# Patient Record
Sex: FEMALE | Race: WHITE | Employment: OTHER | URBAN - METROPOLITAN AREA
[De-identification: names, ages, dates, MRNs, and addresses within clinical notes are randomized per-mention and may not be internally consistent; named-entity substitution may affect disease eponyms.]

---

## 2018-03-30 ENCOUNTER — APPOINTMENT (OUTPATIENT)
Dept: GENERAL RADIOLOGY | Age: 83
End: 2018-03-30
Attending: EMERGENCY MEDICINE
Payer: MEDICARE

## 2018-03-30 ENCOUNTER — APPOINTMENT (OUTPATIENT)
Dept: CT IMAGING | Age: 83
End: 2018-03-30
Attending: EMERGENCY MEDICINE
Payer: MEDICARE

## 2018-03-30 ENCOUNTER — HOSPITAL ENCOUNTER (OUTPATIENT)
Age: 83
Setting detail: OBSERVATION
Discharge: HOME HEALTH | End: 2018-04-01
Attending: EMERGENCY MEDICINE | Admitting: INTERNAL MEDICINE
Payer: MEDICARE

## 2018-03-30 DIAGNOSIS — R50.9 FEVER, UNSPECIFIED FEVER CAUSE: ICD-10-CM

## 2018-03-30 DIAGNOSIS — M19.90 ACUTE ARTHRITIS: ICD-10-CM

## 2018-03-30 DIAGNOSIS — M25.462 KNEE EFFUSION, LEFT: ICD-10-CM

## 2018-03-30 DIAGNOSIS — M25.562 ACUTE PAIN OF LEFT KNEE: Primary | ICD-10-CM

## 2018-03-30 DIAGNOSIS — E86.0 DEHYDRATION: ICD-10-CM

## 2018-03-30 PROBLEM — D72.829 LEUKOCYTOSIS: Status: ACTIVE | Noted: 2018-03-30

## 2018-03-30 PROBLEM — I10 HTN (HYPERTENSION): Status: ACTIVE | Noted: 2018-03-30

## 2018-03-30 PROBLEM — E78.00 HIGH CHOLESTEROL: Chronic | Status: ACTIVE | Noted: 2018-03-30

## 2018-03-30 PROBLEM — M10.9 GOUT: Status: ACTIVE | Noted: 2018-03-30

## 2018-03-30 PROBLEM — R79.89 ELEVATED SERUM CREATININE: Status: ACTIVE | Noted: 2018-03-30

## 2018-03-30 PROBLEM — R73.9 HYPERGLYCEMIA: Status: ACTIVE | Noted: 2018-03-30

## 2018-03-30 PROBLEM — I10 HTN (HYPERTENSION): Chronic | Status: ACTIVE | Noted: 2018-03-30

## 2018-03-30 LAB
ALBUMIN SERPL-MCNC: 3 G/DL (ref 3.5–5)
ALBUMIN/GLOB SERPL: 0.8 {RATIO} (ref 1.1–2.2)
ALP SERPL-CCNC: 72 U/L (ref 45–117)
ALT SERPL-CCNC: 17 U/L (ref 12–78)
ANION GAP SERPL CALC-SCNC: 11 MMOL/L (ref 5–15)
APPEARANCE FLD: ABNORMAL
APPEARANCE UR: CLEAR
AST SERPL-CCNC: 20 U/L (ref 15–37)
BACTERIA URNS QL MICRO: NEGATIVE /HPF
BASOPHILS # BLD: 0.1 K/UL (ref 0–0.1)
BASOPHILS NFR BLD: 1 % (ref 0–1)
BILIRUB SERPL-MCNC: 0.9 MG/DL (ref 0.2–1)
BILIRUB UR QL: NEGATIVE
BODY FLD TYPE: NORMAL
BUN SERPL-MCNC: 40 MG/DL (ref 6–20)
BUN/CREAT SERPL: 33 (ref 12–20)
CALCIUM SERPL-MCNC: 10.8 MG/DL (ref 8.5–10.1)
CHLORIDE SERPL-SCNC: 100 MMOL/L (ref 97–108)
CO2 SERPL-SCNC: 27 MMOL/L (ref 21–32)
COLOR FLD: ABNORMAL
COLOR UR: ABNORMAL
CREAT SERPL-MCNC: 1.23 MG/DL (ref 0.55–1.02)
CRP SERPL-MCNC: 5.42 MG/DL
CRYSTALS FLD MICRO: NORMAL
DIFFERENTIAL METHOD BLD: ABNORMAL
EOSINOPHIL # BLD: 0 K/UL (ref 0–0.4)
EOSINOPHIL NFR BLD: 0 % (ref 0–7)
EPITH CASTS URNS QL MICRO: ABNORMAL /LPF
ERYTHROCYTE [DISTWIDTH] IN BLOOD BY AUTOMATED COUNT: 13 % (ref 11.5–14.5)
ERYTHROCYTE [SEDIMENTATION RATE] IN BLOOD: 66 MM/HR (ref 0–30)
GLOBULIN SER CALC-MCNC: 3.8 G/DL (ref 2–4)
GLUCOSE SERPL-MCNC: 133 MG/DL (ref 65–100)
GLUCOSE UR STRIP.AUTO-MCNC: NEGATIVE MG/DL
HCT VFR BLD AUTO: 32.5 % (ref 35–47)
HGB BLD-MCNC: 10.6 G/DL (ref 11.5–16)
HGB UR QL STRIP: NEGATIVE
IMM GRANULOCYTES # BLD: 0 K/UL (ref 0–0.04)
IMM GRANULOCYTES NFR BLD AUTO: 0 % (ref 0–0.5)
KETONES UR QL STRIP.AUTO: ABNORMAL MG/DL
LACTATE SERPL-SCNC: 1 MMOL/L (ref 0.4–2)
LEUKOCYTE ESTERASE UR QL STRIP.AUTO: NEGATIVE
LYMPHOCYTES # BLD: 0.1 K/UL (ref 0.8–3.5)
LYMPHOCYTES NFR BLD: 1 % (ref 12–49)
LYMPHOCYTES NFR FLD: 2 %
MCH RBC QN AUTO: 30.5 PG (ref 26–34)
MCHC RBC AUTO-ENTMCNC: 32.6 G/DL (ref 30–36.5)
MCV RBC AUTO: 93.7 FL (ref 80–99)
MONOCYTES # BLD: 0.2 K/UL (ref 0–1)
MONOCYTES NFR BLD: 2 % (ref 5–13)
MONOS+MACROS NFR FLD: 3 %
NEUTROPHILS NFR FLD: 95 %
NEUTS BAND NFR BLD MANUAL: 6 %
NEUTS SEG # BLD: 10.2 K/UL (ref 1.8–8)
NEUTS SEG NFR BLD: 90 % (ref 32–75)
NITRITE UR QL STRIP.AUTO: NEGATIVE
NRBC # BLD: 0 K/UL (ref 0–0.01)
NRBC BLD-RTO: 0 PER 100 WBC
NUC CELL # FLD: ABNORMAL /CU MM
PH UR STRIP: 5 [PH] (ref 5–8)
PLATELET # BLD AUTO: 188 K/UL (ref 150–400)
PMV BLD AUTO: 10 FL (ref 8.9–12.9)
POTASSIUM SERPL-SCNC: 3.6 MMOL/L (ref 3.5–5.1)
PROT SERPL-MCNC: 6.8 G/DL (ref 6.4–8.2)
PROT UR STRIP-MCNC: NEGATIVE MG/DL
RBC # BLD AUTO: 3.47 M/UL (ref 3.8–5.2)
RBC # FLD: >100 /CU MM
RBC #/AREA URNS HPF: ABNORMAL /HPF (ref 0–5)
RBC MORPH BLD: ABNORMAL
SODIUM SERPL-SCNC: 138 MMOL/L (ref 136–145)
SP GR UR REFRACTOMETRY: 1.01 (ref 1–1.03)
SPECIMEN SOURCE FLD: ABNORMAL
TROPONIN I SERPL-MCNC: <0.04 NG/ML
UA: UC IF INDICATED,UAUC: ABNORMAL
UROBILINOGEN UR QL STRIP.AUTO: 0.2 EU/DL (ref 0.2–1)
WBC # BLD AUTO: 10.6 K/UL (ref 3.6–11)
WBC URNS QL MICRO: ABNORMAL /HPF (ref 0–4)

## 2018-03-30 PROCEDURE — 89060 EXAM SYNOVIAL FLUID CRYSTALS: CPT | Performed by: EMERGENCY MEDICINE

## 2018-03-30 PROCEDURE — 77030032490 HC SLV COMPR SCD KNE COVD -B

## 2018-03-30 PROCEDURE — 73562 X-RAY EXAM OF KNEE 3: CPT

## 2018-03-30 PROCEDURE — 70450 CT HEAD/BRAIN W/O DYE: CPT

## 2018-03-30 PROCEDURE — 80053 COMPREHEN METABOLIC PANEL: CPT | Performed by: EMERGENCY MEDICINE

## 2018-03-30 PROCEDURE — 81001 URINALYSIS AUTO W/SCOPE: CPT | Performed by: EMERGENCY MEDICINE

## 2018-03-30 PROCEDURE — 87205 SMEAR GRAM STAIN: CPT | Performed by: EMERGENCY MEDICINE

## 2018-03-30 PROCEDURE — 74011250636 HC RX REV CODE- 250/636: Performed by: INTERNAL MEDICINE

## 2018-03-30 PROCEDURE — 72125 CT NECK SPINE W/O DYE: CPT

## 2018-03-30 PROCEDURE — 51798 US URINE CAPACITY MEASURE: CPT

## 2018-03-30 PROCEDURE — 96361 HYDRATE IV INFUSION ADD-ON: CPT

## 2018-03-30 PROCEDURE — 99218 HC RM OBSERVATION: CPT

## 2018-03-30 PROCEDURE — 75810000054 HC ARTHOCENTISIS JOINT

## 2018-03-30 PROCEDURE — 99285 EMERGENCY DEPT VISIT HI MDM: CPT

## 2018-03-30 PROCEDURE — 74011250636 HC RX REV CODE- 250/636: Performed by: EMERGENCY MEDICINE

## 2018-03-30 PROCEDURE — 85025 COMPLETE CBC W/AUTO DIFF WBC: CPT | Performed by: EMERGENCY MEDICINE

## 2018-03-30 PROCEDURE — 96360 HYDRATION IV INFUSION INIT: CPT

## 2018-03-30 PROCEDURE — 74011250637 HC RX REV CODE- 250/637: Performed by: EMERGENCY MEDICINE

## 2018-03-30 PROCEDURE — 73030 X-RAY EXAM OF SHOULDER: CPT

## 2018-03-30 PROCEDURE — 93005 ELECTROCARDIOGRAM TRACING: CPT

## 2018-03-30 PROCEDURE — 84484 ASSAY OF TROPONIN QUANT: CPT | Performed by: EMERGENCY MEDICINE

## 2018-03-30 PROCEDURE — 77030011943

## 2018-03-30 PROCEDURE — 74011250636 HC RX REV CODE- 250/636: Performed by: PHYSICIAN ASSISTANT

## 2018-03-30 PROCEDURE — 85652 RBC SED RATE AUTOMATED: CPT | Performed by: EMERGENCY MEDICINE

## 2018-03-30 PROCEDURE — 71045 X-RAY EXAM CHEST 1 VIEW: CPT

## 2018-03-30 PROCEDURE — 74011000250 HC RX REV CODE- 250: Performed by: PHYSICIAN ASSISTANT

## 2018-03-30 PROCEDURE — 83605 ASSAY OF LACTIC ACID: CPT | Performed by: EMERGENCY MEDICINE

## 2018-03-30 PROCEDURE — 86140 C-REACTIVE PROTEIN: CPT | Performed by: EMERGENCY MEDICINE

## 2018-03-30 PROCEDURE — 96372 THER/PROPH/DIAG INJ SC/IM: CPT

## 2018-03-30 PROCEDURE — 74011000250 HC RX REV CODE- 250: Performed by: EMERGENCY MEDICINE

## 2018-03-30 PROCEDURE — 73110 X-RAY EXAM OF WRIST: CPT

## 2018-03-30 PROCEDURE — 74011250637 HC RX REV CODE- 250/637: Performed by: INTERNAL MEDICINE

## 2018-03-30 PROCEDURE — 89050 BODY FLUID CELL COUNT: CPT | Performed by: EMERGENCY MEDICINE

## 2018-03-30 PROCEDURE — 36415 COLL VENOUS BLD VENIPUNCTURE: CPT | Performed by: EMERGENCY MEDICINE

## 2018-03-30 PROCEDURE — 51701 INSERT BLADDER CATHETER: CPT

## 2018-03-30 RX ORDER — CHOLECALCIFEROL (VITAMIN D3) 125 MCG
1 CAPSULE ORAL DAILY
COMMUNITY

## 2018-03-30 RX ORDER — SIMVASTATIN 20 MG/1
40 TABLET, FILM COATED ORAL
Status: DISCONTINUED | OUTPATIENT
Start: 2018-03-30 | End: 2018-04-01 | Stop reason: HOSPADM

## 2018-03-30 RX ORDER — LIDOCAINE HYDROCHLORIDE 10 MG/ML
2 INJECTION, SOLUTION EPIDURAL; INFILTRATION; INTRACAUDAL; PERINEURAL
Status: COMPLETED | OUTPATIENT
Start: 2018-03-30 | End: 2018-03-30

## 2018-03-30 RX ORDER — ANASTROZOLE 1 MG/1
1 TABLET ORAL DAILY
Status: DISCONTINUED | OUTPATIENT
Start: 2018-03-31 | End: 2018-04-01 | Stop reason: HOSPADM

## 2018-03-30 RX ORDER — OXYCODONE AND ACETAMINOPHEN 5; 325 MG/1; MG/1
1 TABLET ORAL
Status: DISCONTINUED | OUTPATIENT
Start: 2018-03-30 | End: 2018-04-01 | Stop reason: HOSPADM

## 2018-03-30 RX ORDER — HYDROMORPHONE HYDROCHLORIDE 2 MG/ML
0.5 INJECTION, SOLUTION INTRAMUSCULAR; INTRAVENOUS; SUBCUTANEOUS
Status: DISCONTINUED | OUTPATIENT
Start: 2018-03-30 | End: 2018-04-01 | Stop reason: HOSPADM

## 2018-03-30 RX ORDER — HEPARIN SODIUM 5000 [USP'U]/ML
5000 INJECTION, SOLUTION INTRAVENOUS; SUBCUTANEOUS EVERY 8 HOURS
Status: DISCONTINUED | OUTPATIENT
Start: 2018-03-30 | End: 2018-04-01 | Stop reason: HOSPADM

## 2018-03-30 RX ORDER — TRIAMCINOLONE ACETONIDE 40 MG/ML
40 INJECTION, SUSPENSION INTRA-ARTICULAR; INTRAMUSCULAR
Status: COMPLETED | OUTPATIENT
Start: 2018-03-30 | End: 2018-03-30

## 2018-03-30 RX ORDER — ACETAMINOPHEN 325 MG/1
650 TABLET ORAL
Status: DISCONTINUED | OUTPATIENT
Start: 2018-03-30 | End: 2018-04-01 | Stop reason: HOSPADM

## 2018-03-30 RX ORDER — BUPIVACAINE HYDROCHLORIDE 5 MG/ML
3 INJECTION, SOLUTION EPIDURAL; INTRACAUDAL
Status: COMPLETED | OUTPATIENT
Start: 2018-03-30 | End: 2018-03-30

## 2018-03-30 RX ORDER — SODIUM CHLORIDE 0.9 % (FLUSH) 0.9 %
5-10 SYRINGE (ML) INJECTION AS NEEDED
Status: DISCONTINUED | OUTPATIENT
Start: 2018-03-30 | End: 2018-04-01 | Stop reason: HOSPADM

## 2018-03-30 RX ORDER — SODIUM CHLORIDE 9 MG/ML
75 INJECTION, SOLUTION INTRAVENOUS CONTINUOUS
Status: DISCONTINUED | OUTPATIENT
Start: 2018-03-30 | End: 2018-04-01 | Stop reason: HOSPADM

## 2018-03-30 RX ORDER — DOCUSATE SODIUM 100 MG/1
100 CAPSULE, LIQUID FILLED ORAL 2 TIMES DAILY
Status: DISCONTINUED | OUTPATIENT
Start: 2018-03-30 | End: 2018-04-01 | Stop reason: HOSPADM

## 2018-03-30 RX ORDER — PROCHLORPERAZINE EDISYLATE 5 MG/ML
10 INJECTION INTRAMUSCULAR; INTRAVENOUS
Status: DISCONTINUED | OUTPATIENT
Start: 2018-03-30 | End: 2018-04-01 | Stop reason: HOSPADM

## 2018-03-30 RX ORDER — LIDOCAINE HYDROCHLORIDE AND EPINEPHRINE 10; 10 MG/ML; UG/ML
1.5 INJECTION, SOLUTION INFILTRATION; PERINEURAL ONCE
Status: COMPLETED | OUTPATIENT
Start: 2018-03-30 | End: 2018-03-30

## 2018-03-30 RX ORDER — SODIUM CHLORIDE 0.9 % (FLUSH) 0.9 %
5-10 SYRINGE (ML) INJECTION EVERY 8 HOURS
Status: DISCONTINUED | OUTPATIENT
Start: 2018-03-30 | End: 2018-04-01 | Stop reason: HOSPADM

## 2018-03-30 RX ORDER — ANASTROZOLE 1 MG/1
1 TABLET ORAL DAILY
COMMUNITY
End: 2020-09-21

## 2018-03-30 RX ORDER — POLYETHYLENE GLYCOL 3350 17 G/17G
17 POWDER, FOR SOLUTION ORAL
Status: DISPENSED | OUTPATIENT
Start: 2018-03-30 | End: 2018-03-30

## 2018-03-30 RX ORDER — TRAMADOL HYDROCHLORIDE 50 MG/1
50 TABLET ORAL
Status: COMPLETED | OUTPATIENT
Start: 2018-03-30 | End: 2018-03-30

## 2018-03-30 RX ORDER — LOSARTAN POTASSIUM AND HYDROCHLOROTHIAZIDE 25; 100 MG/1; MG/1
1 TABLET ORAL DAILY
COMMUNITY
End: 2020-09-21

## 2018-03-30 RX ADMIN — POLYETHYLENE GLYCOL (3350) 17 G: 17 POWDER, FOR SOLUTION ORAL at 18:02

## 2018-03-30 RX ADMIN — OXYCODONE HYDROCHLORIDE AND ACETAMINOPHEN 1 TABLET: 5; 325 TABLET ORAL at 15:15

## 2018-03-30 RX ADMIN — POLYETHYLENE GLYCOL (3350) 17 G: 17 POWDER, FOR SOLUTION ORAL at 17:17

## 2018-03-30 RX ADMIN — DOCUSATE SODIUM 100 MG: 100 CAPSULE, LIQUID FILLED ORAL at 17:17

## 2018-03-30 RX ADMIN — LIDOCAINE HYDROCHLORIDE,EPINEPHRINE BITARTRATE 15 MG: 10; .01 INJECTION, SOLUTION INFILTRATION; PERINEURAL at 11:15

## 2018-03-30 RX ADMIN — OXYCODONE HYDROCHLORIDE AND ACETAMINOPHEN 1 TABLET: 5; 325 TABLET ORAL at 20:59

## 2018-03-30 RX ADMIN — HEPARIN SODIUM 5000 UNITS: 5000 INJECTION, SOLUTION INTRAVENOUS; SUBCUTANEOUS at 20:58

## 2018-03-30 RX ADMIN — SIMVASTATIN 40 MG: 20 TABLET, FILM COATED ORAL at 20:58

## 2018-03-30 RX ADMIN — LIDOCAINE HYDROCHLORIDE 2 ML: 10 INJECTION, SOLUTION EPIDURAL; INFILTRATION; INTRACAUDAL; PERINEURAL at 17:47

## 2018-03-30 RX ADMIN — BUPIVACAINE HYDROCHLORIDE 15 MG: 5 INJECTION, SOLUTION EPIDURAL; INTRACAUDAL at 17:47

## 2018-03-30 RX ADMIN — TRAMADOL HYDROCHLORIDE 50 MG: 50 TABLET, FILM COATED ORAL at 10:33

## 2018-03-30 RX ADMIN — SODIUM CHLORIDE 75 ML/HR: 900 INJECTION, SOLUTION INTRAVENOUS at 13:06

## 2018-03-30 RX ADMIN — HEPARIN SODIUM 5000 UNITS: 5000 INJECTION, SOLUTION INTRAVENOUS; SUBCUTANEOUS at 13:35

## 2018-03-30 RX ADMIN — SODIUM CHLORIDE 1000 ML: 900 INJECTION, SOLUTION INTRAVENOUS at 12:13

## 2018-03-30 RX ADMIN — TRIAMCINOLONE ACETONIDE 40 MG: 40 INJECTION, SUSPENSION INTRA-ARTICULAR; INTRAMUSCULAR at 17:48

## 2018-03-30 RX ADMIN — Medication 10 ML: at 13:08

## 2018-03-30 RX ADMIN — POLYETHYLENE GLYCOL (3350) 17 G: 17 POWDER, FOR SOLUTION ORAL at 20:59

## 2018-03-30 RX ADMIN — Medication 5 ML: at 21:00

## 2018-03-30 RX ADMIN — POLYETHYLENE GLYCOL (3350) 17 G: 17 POWDER, FOR SOLUTION ORAL at 19:36

## 2018-03-30 NOTE — ED PROVIDER NOTES
HPI Comments: 80 y.o. female with past medical history significant for ovarian CA, HTN, and high cholsterol who presents from home with chief complaint of weakness. The pt reports that she had difficulty getting out of bed this morning due to LLE weakness and pain in the L knee. The pt also reports neck pain and LUE pain. The pt reports that she has chronic decreased  strength in the L hand. 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 The pt had the port for 1.5 years. The pt normally ambulates with a a rollator. The pt is on Tramadol for the pain. The pt was taken off Xarelto a few weeks prior to the surgery and was told that she did not need to start it again. The pt denies fever, vomiting, CP, SOB, abdominal pain. There are no other acute medical concerns at this time. Social hx: nonsmoker   PCP: Nereida Walton MD    Note written by Aakash Anderson, as dictated by Jen Escobar MD 8:35 AM      The history is provided by the patient. No  was used. Past Medical History:   Diagnosis Date    High cholesterol     Hypertension     Ovarian cancer [facility])        Past Surgical History:   Procedure Laterality Date    HX HYSTERECTOMY      HX KNEE REPLACEMENT      right 2010    HX SHOULDER REPLACEMENT      right 2011         No family history on file. Social History     Social History    Marital status:      Spouse name: N/A    Number of children: N/A    Years of education: N/A     Occupational History    Not on file. Social History Main Topics    Smoking status: Never Smoker    Smokeless tobacco: Not on file    Alcohol use Not on file    Drug use: Not on file    Sexual activity: Not on file     Other Topics Concern    Not on file     Social History Narrative    No narrative on file         ALLERGIES: Aleve [naproxen sodium]    Review of Systems   Constitutional: Negative for fever.    Eyes: Negative for visual disturbance. Respiratory: Negative for cough, shortness of breath and wheezing. Cardiovascular: Negative for chest pain and leg swelling. Gastrointestinal: Negative for abdominal pain, diarrhea, nausea and vomiting. Genitourinary: Negative for dysuria. Musculoskeletal: Positive for neck pain. Negative for back pain and neck stiffness. L knee pain;  LUE pain;     Skin: Negative for rash. Neurological: Positive for weakness. Negative for syncope and headaches. Psychiatric/Behavioral: Negative for confusion. All other systems reviewed and are negative. There were no vitals filed for this visit. Physical Exam   Constitutional: She appears well-developed and well-nourished. No distress. HENT:   Head: Normocephalic. Eyes: Pupils are equal, round, and reactive to light. Neck: Normal range of motion. Neck supple. Cardiovascular: Normal rate and regular rhythm. No murmur heard. Distal pulses intact;     Pulmonary/Chest: Effort normal and breath sounds normal. No respiratory distress. Abdominal: Soft. There is no tenderness. Musculoskeletal: Normal range of motion. She exhibits no edema. Pain with neck flexion;  L knee has swelling and is warm; possible effusion;     Neurological: She is alert. She has normal strength. No cranial nerve deficit. Weak  strength in the L hand;  LLE weakness possibly due to knee pain;     Skin: Skin is warm and dry. Surgical incisions healing well;     Psychiatric: She has a normal mood and affect. Her behavior is normal.   Nursing note and vitals reviewed.      Note written by Glentana OfficerAakash, as dictated by Melania Denise MD 8:38 AM    [facility]      ED Course       Arthrocentesis  Date/Time: 3/30/2018 11:14 AM  Performed by: Dimitrios Lind  Authorized by: Dimitrios Lind     Consent:     Consent obtained:  Verbal    Consent given by:  Patient    Risks discussed:  Bleeding, incomplete drainage, nerve damage, infection, pain and poor cosmetic result  Location:     Location:  Knee    Knee:  L knee  Anesthesia (see MAR for exact dosages): Anesthesia method:  Local infiltration    Local anesthetic:  Lidocaine 1% WITH epi  Procedure details:     Preparation: Patient was prepped and draped in usual sterile fashion      Needle gauge:  18 G    Ultrasound guidance: no      Approach: inferolateral.    Aspirate amount:  15 cc    Aspirate characteristics:  Cloudy and yellow    Steroid injected: no      Specimen collected: yes    Post-procedure details:     Dressing: 4x4. Patient tolerance of procedure: Tolerated well, no immediate complications  Comments:      Skin prepped with Betadine   Note written by Aakash Murphy, as dictated by Kentrell Cunningham MD 11:16 AM          ED EKG interpretation:  Rhythm: normal sinus rhythm; and regular . Rate (approx.): 83; Axis: normal; ST/T wave: non-specific changes; Note written by Aakash Murphy, as dictated by Kentrell Cunningham MD 8:21 AM    CONSULT NOTE:  11:20 AM Kentrell Cunningham MD spoke with Dr. Lakisha Sanchez , Consult for Hospitalist.  Discussed available diagnostic tests and clinical findings. She will see and admit the pt.

## 2018-03-30 NOTE — PROGRESS NOTES
1640: Patient has not voided since straight cathed in the ED. Bladder scan shows 459mL. Notified Dr. Agusto Bañuelos. Orders to straight cath PRN for bladder scans greater than 200mL. Patient with no BM since 3/25. Notified Dr. Agusto Bañuelos. Orders for miralax q1hr for 4 doses. 1830: Patient attempted to use bed pan multiple times. Unsuccessful. Patient straight cathed. Drained 625mL. Will continue to monitor. Bedside shift change report given to [address] (oncoming nurse) by Anaya Barry RN (offgoing nurse). Report included the following information SBAR, Kardex, Intake/Output, MAR and Recent Results.

## 2018-03-30 NOTE — IP AVS SNAPSHOT
[facility] 
 
 
 [address] [phone] Patient: Rosa Canela MRN: [id number] HECTOR:0/1/1982 About your hospitalization You were admitted on:  March 30, 2018 You last received care in the:  [facility] 5M1 MED SURG 1 You were discharged on:  April 1, 2018 Why you were hospitalized Your primary diagnosis was:  Acute Arthritis Your diagnoses also included:  Htn (Hypertension), High Cholesterol, Elevated Serum Creatinine, Hyperglycemia, Urinary Retention Follow-up Information Follow up With Details Comments Contact Info Gurvinder Cardona MD In 2 weeks  [address] 
[phone] Reno Estes MD   [address] [address] 
[phone] Discharge Orders None A check emy indicates which time of day the medication should be taken. My Medications START taking these medications Instructions Each Dose to Equal  
 Morning Noon Evening Bedtime  
 oxyCODONE-acetaminophen 5-325 mg per tablet Commonly known as:  PERCOCET Your last dose was: Your next dose is: Take 1 Tab by mouth every four (4) hours as needed. Max Daily Amount: 6 Tabs. 1 Tab  
    
   
   
   
  
 senna-docusate 8.6-50 mg per tablet Commonly known as:  Cleavon Hope Your last dose was: Your next dose is: Take 1 Tab by mouth two (2) times a day. To prevent constipation while taking pain medicines. Do not take if you have diarrhea. 1 Tab  
    
   
   
   
  
 tamsulosin 0.4 mg capsule Commonly known as:  FLOMAX Start taking on:  4/2/2018 Your last dose was: Your next dose is: Take 1 Cap by mouth daily for 7 days. 0.4 mg  
    
   
   
   
  
  
CONTINUE taking these medications Instructions Each Dose to Equal  
 Morning Noon Evening Bedtime  
 anastrozole 1 mg tablet Commonly known as:  ARIMIDEX Your last dose was: Your next dose is: Take 1 mg by mouth daily. 1 mg LECITHIN PO Your last dose was: Your next dose is: Take 1,200 mg by mouth daily. 1200 mg  
    
   
   
   
  
 losartan-hydroCHLOROthiazide 100-25 mg per tablet Commonly known as:  HYZAAR Your last dose was: Your next dose is: Take 1 Tab by mouth daily. 1 Tab TRAMADOL PO Your last dose was: Your next dose is: Take 50 mg by mouth two (2) times a day. 50 mg  
    
   
   
   
  
 VITAMIN D3 2,000 unit Tab Generic drug:  cholecalciferol (vitamin D3) Your last dose was: Your next dose is: Take 1 Tab by mouth daily. 1 Tab ZOCOR 40 mg tablet Generic drug:  simvastatin Your last dose was: Your next dose is: Take 40 mg by mouth nightly. 40 mg  
    
   
   
   
  
  
STOP taking these medications STOOL SOFTENER PO Where to Get Your Medications Information on where to get these meds will be given to you by the nurse or doctor. ! Ask your nurse or doctor about these medications  
  oxyCODONE-acetaminophen 5-325 mg per tablet  
 senna-docusate 8.6-50 mg per tablet  
 tamsulosin 0.4 mg capsule Opioid Education Prescription Opioids: What You Need to Know: 
 
 
ADMIT DATE: 3/30/2018 DISCHARGE DATE: 2018 DISCHARGE DIAGNOSIS: 
Acute arthritis MEDICATIONS: 
· It is important that you take the medication exactly as they are prescribed. · Keep your medication in the bottles provided by the pharmacist and keep a list of the medication names, dosages, and times to be taken in your wallet. · Do not take other medications without consulting your doctor. Pain Management: per above medications What to do at [facility] Recommended diet:  Regular Diet Recommended activity: Activity as tolerated If you experience any of the following symptoms then please call your primary care physician or return to the emergency room if you cannot get hold of your doctor: 
Fever, chills, nausea, vomiting, diarrhea, change in mentation, falling, bleeding, shortness of breath Follow Up: Follow-up Information Follow up With Details Comments Contact Info Yissel Blevins MD In 2 weeks  [address] 
[phone] Rin Dasilva MD   [address] [address] 
[phone] Information obtained by : 
I understand that if any problems occur once I am at home I am to contact my physician. I understand and acknowledge receipt of the instructions indicated above. Physician's or R.N.'s Signature                                                                  Date/Time Patient or Representative Signature                                                          Date/Time Introducing [facility]! Tdo Brandon introduces CanaryHop patient portal. Now you can access parts of your medical record, email your doctor's office, and request medication refills online. 1. In your internet browser, go to [email or web address] 2. Click on the First Time User? Click Here link in the Sign In box. You will see the New Member Sign Up page. 3. Enter your Bidstalk Access Code exactly as it appears below. You will not need to use this code after youve completed the sign-up process. If you do not sign up before the expiration date, you must request a new code. · Bidstalk Access Code: [email or web address] Expires: 6/30/2018  9:05 AM 
 
4. Enter the last four digits of your Social Security Number (xxxx) and Date of Birth (mm/dd/yyyy) as indicated and click Submit. You will be taken to the next sign-up page. 5. Create a Bidstalk ID. This will be your Bidstalk login ID and cannot be changed, so think of one that is secure and easy to remember. 6. Create a Bidstalk password. You can change your password at any time. 7. Enter your Password Reset Question and Answer. This can be used at a later time if you forget your password. 8. Enter your e-mail address. You will receive e-mail notification when new information is available in [address] 9. Click Sign Up. You can now view and download portions of your medical record. 10. Click the Download Summary menu link to download a portable copy of your medical information. If you have questions, please visit the Frequently Asked Questions section of the Bidstalk website. Remember, Bidstalk is NOT to be used for urgent needs. For medical emergencies, dial 911. Now available from your iPhone and Android! Introducing Michael Villanueva As a [facility] patient, I wanted to make you aware of our electronic visit tool called Michael Villanueva. [facility] 24/7 allows you to connect within minutes with a medical provider 24 hours a day, seven days a week via a mobile device or tablet or logging into a secure website from your computer. You can access Michael Villanueva from anywhere in the [place].  
 
A virtual visit might be right for you when you have a simple condition and feel like you just dont want to get out of bed, or cant get away from work for an appointment, when your regular [facility] provider is not available (evenings, weekends or holidays), or when youre out of town and need minor care. Electronic visits cost only $49 and if the [facility] 24/7 provider determines a prescription is needed to treat your condition, one can be electronically transmitted to a nearby pharmacy*. Please take a moment to enroll today if you have not already done so. The enrollment process is free and takes just a few minutes. To enroll, please download the [facility] 24/7 lyssa to your tablet or phone, or visit [email or web address] to enroll on your computer. And, as an [address] [place] [address] patient with a Notegraphy account, the results of your visits will be scanned into your electronic medical record and your primary care provider will be able to view the scanned results. We urge you to continue to see your regular [facility] provider for your ongoing medical care. And while your primary care provider may not be the one available when you seek a Quemuluslindsayfin virtual visit, the peace of mind you get from getting a real diagnosis real time can be priceless. For more information on Gopeers, view our Frequently Asked Questions (FAQs) at [email or web address]. Sincerely, 
 
Kang Loaiza MD 
Chief Medical Officer [address] *:  certain medications cannot be prescribed via Gopeers Unresulted Labs-Please follow up with your PCP about these lab tests Order Current Status CULTURE, BODY FLUID W GRAM STAIN Preliminary result Providers Seen During Your Hospitalization Provider Specialty Primary office phone Juana Maldonado MD Emergency Medicine [phone] Raf Glasgow MD Internal Medicine [phone] Your Primary Care Physician (PCP) Primary Care Physician Office Phone Office Fax Edel [place] [phone] You are allergic to the following Allergen Reactions Aleve (Naproxen Sodium) Hives Codeine Nausea and Vomiting Recent Documentation Height Weight Breastfeeding? BMI OB Status Smoking Status 1.549 m 60.3 kg No 25.13 kg/m2 Hysterectomy Never Smoker Emergency Contacts Name Discharge Info Relation Home Work Mobile Jose Luis Levy DISCHARGE CAREGIVER [3] Son [22] [phone] [address] CAREGIVER [3] Daughter [21]   [phone] Patient Belongings The following personal items are in your possession at time of discharge: 
  Dental Appliances: None  Visual Aid: Glasses, With patient   Hearing Aids/Status: At home  Home Medications: None   Jewelry: None  Clothing: Pajamas    Other Valuables: Cell Phone, Eyeglasses  Personal Items Sent to Safe: none Please provide this summary of care documentation to your next provider. Signatures-by signing, you are acknowledging that this After Visit Summary has been reviewed with you and you have received a copy. Patient Signature:  ____________________________________________________________ Date:  ____________________________________________________________  
  
Stephany Salguero Provider Signature:  ____________________________________________________________ Date:  ____________________________________________________________

## 2018-03-30 NOTE — IP AVS SNAPSHOT
[facility] 
 
 
 [address] [facility] [address] [facility] 
[phone] Patient: Lore Hylton MRN: [id number] XZI:[birth year] A check emy indicates which time of day the medication should be taken. My Medications START taking these medications Instructions Each Dose to Equal  
 Morning Noon Evening Bedtime  
 oxyCODONE-acetaminophen 5-325 mg per tablet Commonly known as:  PERCOCET Your last dose was: Your next dose is: Take 1 Tab by mouth every four (4) hours as needed. Max Daily Amount: 6 Tabs. 1 Tab  
    
   
   
   
  
 senna-docusate 8.6-50 mg per tablet Commonly known as:  Mansi Fresh Your last dose was: Your next dose is: Take 1 Tab by mouth two (2) times a day. To prevent constipation while taking pain medicines. Do not take if you have diarrhea. 1 Tab  
    
   
   
   
  
 tamsulosin 0.4 mg capsule Commonly known as:  FLOMAX Start taking on:  4/2/2018 Your last dose was: Your next dose is: Take 1 Cap by mouth daily for 7 days. 0.4 mg  
    
   
   
   
  
  
CONTINUE taking these medications Instructions Each Dose to Equal  
 Morning Noon Evening Bedtime  
 anastrozole 1 mg tablet Commonly known as:  ARIMIDEX Your last dose was: Your next dose is: Take 1 mg by mouth daily. 1 mg LECITHIN PO Your last dose was: Your next dose is: Take 1,200 mg by mouth daily. 1200 mg  
    
   
   
   
  
 losartan-hydroCHLOROthiazide 100-25 mg per tablet Commonly known as:  HYZAAR Your last dose was: Your next dose is: Take 1 Tab by mouth daily. 1 Tab TRAMADOL PO Your last dose was: Your next dose is: Take 50 mg by mouth two (2) times a day.   
 50 mg  
    
   
   
   
  
 VITAMIN D3 2,000 unit Tab Generic drug:  cholecalciferol (vitamin D3) Your last dose was: Your next dose is: Take 1 Tab by mouth daily. 1 Tab ZOCOR 40 mg tablet Generic drug:  simvastatin Your last dose was: Your next dose is: Take 40 mg by mouth nightly. 40 mg  
    
   
   
   
  
  
STOP taking these medications STOOL SOFTENER PO Where to Get Your Medications Information on where to get these meds will be given to you by the nurse or doctor. ! Ask your nurse or doctor about these medications  
  oxyCODONE-acetaminophen 5-325 mg per tablet  
 senna-docusate 8.6-50 mg per tablet  
 tamsulosin 0.4 mg capsule

## 2018-03-30 NOTE — PROGRESS NOTES
3/30/2018  3:11 PM  Case Management Note  Met with patient and daughter to discuss discharge planning. Patient lives independently at [facility]. She has no steps to enter. Patient uses a rollator to ambulate. She has access to a cane / walker. She can dress herself independently    Patient uses [facility] at [place]. She follows Dr. Sushila Black for medical management. Verified charted demographics correct. OBS status educated and letters signed, placed in chart    Daughter Gracie Starr confirmed her or family member can transport on discharge  Care Management Interventions  PCP Verified by CM:  Yes  Mode of Transport at Discharge: Self  Transition of Care Consult (CM Consult): Discharge Planning  Physical Therapy Consult: Yes  Occupational Therapy Consult: Yes  Speech Therapy Consult: No  Current Support Network: Lives Alone, Other (Navdeep Nielson)  Confirm Follow Up Transport: Family  Plan discussed with Pt/Family/Caregiver: Yes  Discharge Location  Discharge Placement: Home with one level    Harsh Cox, [address]

## 2018-03-30 NOTE — PROGRESS NOTES
Problem: Falls - Risk of  Goal: *Absence of Falls  Document Shoshana Fall Risk and appropriate interventions in the flowsheet.    Outcome: Progressing Towards Goal  Fall Risk Interventions:  Mobility Interventions: Communicate number of staff needed for ambulation/transfer, OT consult for ADLs, Patient to call before getting OOB, PT Consult for mobility concerns, PT Consult for assist device competence, Utilize walker, cane, or other assitive device         Medication Interventions: Patient to call before getting OOB, Teach patient to arise slowly    Elimination Interventions: Call light in reach, Patient to call for help with toileting needs, Toileting schedule/hourly rounds

## 2018-03-30 NOTE — PROGRESS NOTES
Primary Nurse [facility] and Ruchi Mccoy, RN performed a dual skin assessment on this patient Impairment noted- see wound doc flow sheet  Helio score is 18

## 2018-03-30 NOTE — PROGRESS NOTES
[facility]: MED RECONCILIATION    Comments/Recommendations:     Patient is awake, alert, oriented, and knowledgeable about home medications. Patient's daughter provided a medication list. Pharmacy student reviewed prescription refill history available with [employer]. Per patient, was taken off of ASA 81mg because \"I was on chemo\" and also taken off of Xarelto because of her surgery last Monday and Dr. Rusty Anders determined she did not to restart Xarelto afterwards. Patient reports having an allergy to codeine, which causes n/v.    Medications added:     · Anastrozole 1mg  · Losartan/HCTZ 100/25mg  · Vitamin D3 2000 units    Medications removed:    · ASA 81mg  · Xarelto    Medications adjusted:    · Changed frequency of docusate to one tablet BID  · Changed frequency of lecithin to one tablet daily  · Changed frequency of tramadol to one tablet BID      Allergies: Aleve [naproxen sodium]    Prior to Admission Medications:     Prior to Admission Medications   Prescriptions Last Dose Informant Patient Reported? Taking? DOCUSATE CALCIUM (STOOL SOFTENER PO)  Child Yes Yes   Sig: Take  by mouth two (2) times a day. LECITHIN PO 3/29/2018 at PM Child Yes Yes   Sig: Take 1,200 mg by mouth daily. TRAMADOL HCL (TRAMADOL PO) 3/29/2018 at Unknown time Child Yes Yes   Sig: Take 50 mg by mouth two (2) times a day. anastrozole (ARIMIDEX) 1 mg tablet 3/29/2018 at Unknown time Child Yes Yes   Sig: Take 1 mg by mouth daily. cholecalciferol, vitamin D3, (VITAMIN D3) 2,000 unit tab 3/9/2018 Child Yes Yes   Sig: Take 1 Tab by mouth daily. losartan-hydroCHLOROthiazide (HYZAAR) 100-25 mg per tablet 3/29/2018 at Unknown time Child Yes Yes   Sig: Take 1 Tab by mouth daily. simvastatin (ZOCOR) 40 mg tablet 3/29/2018 at PM Child Yes Yes   Sig: Take 40 mg by mouth nightly.       Facility-Administered Medications: None      Thank you,    [address] Student

## 2018-03-30 NOTE — CONSULTS
JOINT  CONSULT    Subjective:     Date of Consultation:  March 30, 2018    Referring Physician:  Dr. Sergo Garcia is a 80 y.o. female we are consulted to see for sudden onset over last 24 hrs of L knee pain, L wrist pain and L shoulder pain. Pt. Denies injury. Long history of Osteoarthritis of multiple joints, Hx of R Total Shoulder Arthroplasty and R Total Knee Arthroplasty by Dr. Zena Pierson in the past. Pt. Known to Dr. Jc Oliver. Pt. Denies history of Rheumatological issues to include gout. Hx of joint injections of cortisone in the past. L knee tapped in ER by ER MD.    Patient Active Problem List    Diagnosis Date Noted    Gout 03/30/2018    HTN (hypertension) 03/30/2018    High cholesterol 03/30/2018    Elevated serum creatinine 03/30/2018    Hyperglycemia 03/30/2018    Leukocytosis 03/30/2018     Family History   Problem Relation Age of Onset    Hypertension Other       Social History   Substance Use Topics    Smoking status: Never Smoker    Smokeless tobacco: Not on file    Alcohol use Not on file     Past Medical History:   Diagnosis Date    High cholesterol     Hypertension     Ovarian cancer ([facility] [address]       Past Surgical History:   Procedure Laterality Date    HX HYSTERECTOMY      HX KNEE REPLACEMENT      right 2010    HX SHOULDER REPLACEMENT      right 2011      Prior to Admission medications    Medication Sig Start Date End Date Taking? Authorizing Provider   anastrozole (ARIMIDEX) 1 mg tablet Take 1 mg by mouth daily. Yes Historical Provider   losartan-hydroCHLOROthiazide (HYZAAR) 100-25 mg per tablet Take 1 Tab by mouth daily. Yes Historical Provider   cholecalciferol, vitamin D3, (VITAMIN D3) 2,000 unit tab Take 1 Tab by mouth daily. Yes Historical Provider   simvastatin (ZOCOR) 40 mg tablet Take 40 mg by mouth nightly. Yes Evelia Ramirez MD   LECITHIN PO Take 1,200 mg by mouth daily.    Yes Evelia Ramirez MD   TRAMADOL HCL (TRAMADOL PO) Take 50 mg by mouth two (2) times a day.   Yes Evelia Other, MD   DOCUSATE CALCIUM (STOOL SOFTENER PO) Take  by mouth two (2) times a day. Yes Phys Other, MD     Current Facility-Administered Medications   Medication Dose Route Frequency    0.9% sodium chloride infusion  75 mL/hr IntraVENous CONTINUOUS    sodium chloride (NS) flush 5-10 mL  5-10 mL IntraVENous Q8H    sodium chloride (NS) flush 5-10 mL  5-10 mL IntraVENous PRN    acetaminophen (TYLENOL) tablet 650 mg  650 mg Oral Q4H PRN    oxyCODONE-acetaminophen (PERCOCET) 5-325 mg per tablet 1 Tab  1 Tab Oral Q4H PRN    HYDROmorphone (PF) (DILAUDID) injection 0.5 mg  0.5 mg IntraVENous Q4H PRN    prochlorperazine (COMPAZINE) injection 10 mg  10 mg IntraVENous Q6H PRN    heparin (porcine) injection 5,000 Units  5,000 Units SubCUTAneous Q8H    [START ON 3/31/2018] anastrozole (ARIMIDEX) tablet 1 mg  1 mg Oral DAILY    docusate sodium (COLACE) capsule 100 mg  100 mg Oral BID    simvastatin (ZOCOR) tablet 40 mg  40 mg Oral QHS     Allergies   Allergen Reactions    Aleve [Naproxen Sodium] Hives    Codeine Nausea and Vomiting        Review of Systems:  A comprehensive review of systems was negative except for that written in the HPI. Objective:     Patient Vitals for the past 8 hrs:   BP Temp Pulse Resp SpO2 Height Weight   18 1252 138/69 98.8 °F (37.1 °C) 82 20 98 % - -   18 1200 121/58 - 78 - 99 % - -   18 1145 124/53 - 76 - 97 % - -   18 1045 144/61 - 79 - 96 % - -   18 0915 146/53 - 81 - 100 % - -   18 0900 141/55 100.4 °F (38 °C) 89 - 96 % - -   18 0845 132/54 - 88 - - - -   18 0831 133/51 98.8 °F (37.1 °C) 87 24 97 % 5' 1\" (1.549 m) 60.3 kg (133 lb)     Temp (24hrs), Av.3 °F (37.4 °C), Min:98.8 °F (37.1 °C), Max:100.4 °F (38 °C)        EXAM: I examined pt's L knee. Full extension, moderate to severe pain with flexion, No laxity with  Negative Homans sign. Medial joint line tenderness to palpation.  Moderate effusion noted, no erythema or ecchymosis noted. NVI distally. I examined pt's L shoulder, no erythema noted, +effusion. +GH pain with rotation of the shoulder. I examined pt's L wrist, no erythema noted, + effusion. + Radiocarpal pain with flexion/extension. NVI distally all finger BUE's. Radial pulses = BUE's. EXAM:  XR SHOULDER LT AP/LAT MIN 2 V     INDICATION:   Left shoulder pain with no known injury.     COMPARISON: None.     FINDINGS: Three views of the left shoulder demonstrate no fracture, dislocation  or other acute abnormality. There is moderately severe glenohumeral  osteoarthritis. Acromioclavicular joint arthropathy is also noted. There is soft  tissue calcification in the subacromial space, suggestive of calcific bursitis.     IMPRESSION  IMPRESSION:  No acute abnormality. Degenerative changes as described above. EXAM: XR WRIST LT AP/LAT/OBL MIN 3V     INDICATION:  pain. Left wrist pain     COMPARISON: None.     FINDINGS: Three  views of the left wrist demonstrate no fracture or other acute  osseous or articular abnormality. There is widening of the scapholunate  interval. Osteoarthritis and chondrocalcinosis are present throughout the wrist.   There is soft tissue swelling about the wrist.     IMPRESSION  IMPRESSION:  No acute abnormality. Widened scapholunate interval is compatible  with old scapholunate ligament injury. Osteoarthritis and chondrocalcinosis  throughout the wrist. Soft tissue swelling. EXAM:  XR KNEE LT 3 V     INDICATION:   Left knee pain with no known injury. .     COMPARISON: None.     FINDINGS: Three views of the left knee demonstrate no fracture or other acute  osseous or articular abnormality. There is tricompartmental osteoarthritis with  associated chondrocalcinosis. There is no effusion.     IMPRESSION  IMPRESSION:  No acute abnormality.  Osteoarthritis and chondrocalcinosis.         Data Review   Recent Results (from the past 24 hour(s))   URINALYSIS W/ REFLEX CULTURE Collection Time: 03/30/18  8:49 AM   Result Value Ref Range    Color YELLOW/STRAW      Appearance CLEAR CLEAR      Specific gravity 1.014 1.003 - 1.030      pH (UA) 5.0 5.0 - 8.0      Protein NEGATIVE  NEG mg/dL    Glucose NEGATIVE  NEG mg/dL    Ketone TRACE (A) NEG mg/dL    Bilirubin NEGATIVE  NEG      Blood NEGATIVE  NEG      Urobilinogen 0.2 0.2 - 1.0 EU/dL    Nitrites NEGATIVE  NEG      Leukocyte Esterase NEGATIVE  NEG      WBC 0-4 0 - 4 /hpf    RBC 0-5 0 - 5 /hpf    Epithelial cells FEW FEW /lpf    Bacteria NEGATIVE  NEG /hpf    UA:UC IF INDICATED CULTURE NOT INDICATED BY UA RESULT CNI     CBC WITH AUTOMATED DIFF    Collection Time: 03/30/18  9:13 AM   Result Value Ref Range    WBC 10.6 3.6 - 11.0 K/uL    RBC 3.47 (L) 3.80 - 5.20 M/uL    HGB 10.6 (L) 11.5 - 16.0 g/dL    HCT 32.5 (L) 35.0 - 47.0 %    MCV 93.7 80.0 - 99.0 FL    MCH 30.5 26.0 - 34.0 PG    MCHC 32.6 30.0 - 36.5 g/dL    RDW 13.0 11.5 - 14.5 %    PLATELET 380 920 - 335 K/uL    MPV 10.0 8.9 - 12.9 FL    NRBC 0.0 0  WBC    ABSOLUTE NRBC 0.00 0.00 - 0.01 K/uL    NEUTROPHILS 90 (H) 32 - 75 %    BAND NEUTROPHILS 6 %    LYMPHOCYTES 1 (L) 12 - 49 %    MONOCYTES 2 (L) 5 - 13 %    EOSINOPHILS 0 0 - 7 %    BASOPHILS 1 0 - 1 %    IMMATURE GRANULOCYTES 0 0.0 - 0.5 %    ABS. NEUTROPHILS 10.2 (H) 1.8 - 8.0 K/UL    ABS. LYMPHOCYTES 0.1 (L) 0.8 - 3.5 K/UL    ABS. MONOCYTES 0.2 0.0 - 1.0 K/UL    ABS. EOSINOPHILS 0.0 0.0 - 0.4 K/UL    ABS. BASOPHILS 0.1 0.0 - 0.1 K/UL    ABS. IMM.  GRANS. 0.0 0.00 - 0.04 K/UL    DF MANUAL      RBC COMMENTS NORMOCYTIC, NORMOCHROMIC     METABOLIC PANEL, COMPREHENSIVE    Collection Time: 03/30/18  9:13 AM   Result Value Ref Range    Sodium 138 136 - 145 mmol/L    Potassium 3.6 3.5 - 5.1 mmol/L    Chloride 100 97 - 108 mmol/L    CO2 27 21 - 32 mmol/L    Anion gap 11 5 - 15 mmol/L    Glucose 133 (H) 65 - 100 mg/dL    BUN 40 (H) 6 - 20 MG/DL    Creatinine 1.23 (H) 0.55 - 1.02 MG/DL    BUN/Creatinine ratio 33 (H) 12 - 20      GFR est AA 50 (L) >60 ml/min/1.73m2    GFR est non-AA 41 (L) >60 ml/min/1.73m2    Calcium 10.8 (H) 8.5 - 10.1 MG/DL    Bilirubin, total 0.9 0.2 - 1.0 MG/DL    ALT (SGPT) 17 12 - 78 U/L    AST (SGOT) 20 15 - 37 U/L    Alk. phosphatase 72 45 - 117 U/L    Protein, total 6.8 6.4 - 8.2 g/dL    Albumin 3.0 (L) 3.5 - 5.0 g/dL    Globulin 3.8 2.0 - 4.0 g/dL    A-G Ratio 0.8 (L) 1.1 - 2.2     LACTIC ACID    Collection Time: 03/30/18  9:13 AM   Result Value Ref Range    Lactic acid 1.0 0.4 - 2.0 MMOL/L   TROPONIN I    Collection Time: 03/30/18  9:13 AM   Result Value Ref Range    Troponin-I, Qt. <0.04 <0.05 ng/mL   C REACTIVE PROTEIN, QT    Collection Time: 03/30/18  9:13 AM   Result Value Ref Range    C-Reactive protein 5.42 (H) <0.60 mg/dL   SED RATE (ESR)    Collection Time: 03/30/18  9:13 AM   Result Value Ref Range    Sed rate, automated 66 (H) 0 - 30 mm/hr   CELL COUNT, BODY FLUID    Collection Time: 03/30/18 11:17 AM   Result Value Ref Range    BODY FLUID TYPE KNEE FLUID      FLUID COLOR STRAW      FLUID APPEARANCE CLOUDY      FLUID RBC CT. >100 (H) 0 /cu mm    FLUID NUCLEATED CELLS 14875 /cu mm    FLD NEUTROPHILS 95 (A) NRRE %    FLD LYMPHS 2 (A) NRRE %    FLD MONO/MACROPHAGES 3 (A) NRRE %   CRYSTALS, SYNOVIAL FLUID    Collection Time: 03/30/18 11:17 AM   Result Value Ref Range    FLUID TYPE(7) KNEE FLUID      Crystals, body fluid NO CRYSTALS SEEN WITH POLARIZED LIGHT           Assessment/Plan:     Polyarthalgia  Pseudogouty Osteoarthritis  Chondrocalcinosis L shoulder, L wrist and L knee      Plan: Labs not consistant with septic joint, more consistant with gout. L knee pain the worst. Recommend cortisone injection of the L knee only, immobilize the L wrist and L shoulder given recent partial nephrectomy. Hospitalist ok with on joint injection. Plan to inject L knee. Procedure: I explained the risk vs. Benefits to Pt in regards to injecting the L knee.  Pt. states they understand and give verbal and written consent for the procedure. Under sterile conditions,  I injected SQ area of the superior lateral aspect of the L knee with 2ml Lidocaine, I then injected pt. With 40mg Kenalog mixed with 2ml 0.5% Marcaine in the intra-articular aspect of the L knee without difficulty. Pt. Tolerated the Procedure well. Bleeding controlled. Dr. Vahid Santiago agrees with plan. Concern for Polyarthralgia, recommend Rheumotology consult. Thank you for allowing us to take part in this patients care.       Charity Echeverria PA-C  Orthopaedic Surgery CHRIS Echeverria PA-C

## 2018-03-30 NOTE — H&P
[facility]  [address] [facility], [facility] [address]  [phone]    Admission History and Physical      NAME:  Davie Nam   :   1931   MRN:  [id number]     PCP:  Jayne Ortega MD     Date/Time:  3/30/2018         Subjective:     CHIEF COMPLAINT: Pain     HISTORY OF PRESENT ILLNESS:     Ms. Cara Hutson is a 80 y.o.  female who is admitted with possible gout. Ms. Cara Hutson presented to the Emergency Department this AM complaining of pain: left knee, sudden onset, this AM, present when she woke up, worse with movement, difficulty with bearing weight, constant. In the ED, she had an arthrocentesis, results are not back yet. She was given Ultram 50 mg, which she takes chronically for pain. She was given no additional pain medication. There is not documentation of an attempt at ambulation after pain medication and patient states no attempt has been made. Her rollator is still in her daughter's car. History obtained from child, chart review and the patient. Previous records reviewed    Past Medical History:   Diagnosis Date    High cholesterol     Hypertension     Ovarian cancer ([facility] [address]         Past Surgical History:   Procedure Laterality Date    HX HYSTERECTOMY      HX KNEE REPLACEMENT      right 2010    HX SHOULDER REPLACEMENT      right        Social History   Substance Use Topics    Smoking status: Never Smoker    Smokeless tobacco: Not on file    Alcohol use Not on file        Family History   Problem Relation Age of Onset    Hypertension Other         Allergies   Allergen Reactions    Aleve [Naproxen Sodium] Hives    Codeine Nausea and Vomiting        Prior to Admission medications    Medication Sig Start Date End Date Taking? Authorizing Provider   anastrozole (ARIMIDEX) 1 mg tablet Take 1 mg by mouth daily. Yes Historical Provider   losartan-hydroCHLOROthiazide (HYZAAR) 100-25 mg per tablet Take 1 Tab by mouth daily.    Yes Historical Provider   cholecalciferol, vitamin D3, (VITAMIN D3) 2,000 unit tab Take 1 Tab by mouth daily. Yes Historical Provider   simvastatin (ZOCOR) 40 mg tablet Take 40 mg by mouth nightly. Yes Evelia Ramirez MD   LECITHIN PO Take 1,200 mg by mouth daily. Yes Evelia Ramirez MD   TRAMADOL HCL (TRAMADOL PO) Take 50 mg by mouth two (2) times a day. Yes Evelia Ramirez MD   DOCUSATE CALCIUM (STOOL SOFTENER PO) Take  by mouth two (2) times a day.    Yes Evelia Ramirez MD         Review of Systems:  (bold if positive, if negative)    Gen:  Eyes:  ENT:  CVS:  Pulm:  GI:    :    MS:  Pain, weakness, swelling, arthritisSkin:  Psych:  Endo:    Hem:  Renal:    Neuro:            Objective:      VITALS:    Vital signs reviewed; most recent are:    Visit Vitals    /53    Pulse 81    Temp 100.4 °F (38 °C)    Resp 24    Ht 5' 1\" (1.549 m)    Wt 60.3 kg (133 lb)    SpO2 100%    BMI 25.13 kg/m2     SpO2 Readings from Last 6 Encounters:   03/30/18 100%   12/06/16 97%        No intake or output data in the 24 hours ending 03/30/18 1213         Exam:     Physical Exam:    Gen: Well-developed, well-nourished, frail, eldelry, in no acute distress  HEENT:  Pink conjunctivae, PERRL, hearing intact to voice, moist mucous membranes  Neck: Supple, without masses, thyroid non-tender  Resp: No accessory muscle use, clear breath sounds without wheezes rales or rhonchi  Card: No murmurs, normal S1, S2 without thrills, bruits or peripheral edema  Abd:  Soft, non-tender, non-distended, normoactive bowel sounds are present, no palpable organomegaly and no detectable hernias  Lymph:  No cervical or inguinal adenopathy  Musc: Mild swelling, left knee, no erythema, minimal tenderness  Skin: No rashes or ulcers, skin turgor is good  Neuro:  Cranial nerves are grossly intact, no focal motor weakness, follows commands appropriately  Psych:  Good insight, oriented to person, place and time, alert             Labs:    Recent Labs 03/30/18   0913   WBC  10.6   HGB  10.6*   HCT  32.5*   PLT  188     Recent Labs      03/30/18   0913   NA  138   K  3.6   CL  100   CO2  27   GLU  133*   BUN  40*   CREA  1.23*   CA  10.8*   ALB  3.0*   TBILI  0.9   SGOT  20   ALT  17     Lab Results   Component Value Date/Time    Glucose (POC) 109 (H) 01/24/2011 06:39 AM     No results for input(s): PH, PCO2, PO2, HCO3, FIO2 in the last 72 hours. No results for input(s): INR in the last 72 hours. No lab exists for component: INREXT, INREXT    Additional testing:  Chest X-ray: no acute process, visualized by me.   Results not reviewed with Radiologist.       Assessment/Plan:       Principal Problem:    Gout (3/30/2018)   - vs pseudogout vs arthritis, doubt acute infection   - await arthrocentesis results, consider steroids if positive for crystals, consider antibiotics only if WBC is highly elevated   - continue pain meds, she likely needs more than her chronic pain medication, will use Percocet PO and Dilaudid IV   - mobilize with PT/OT     Active Problems:    HTN (hypertension) (3/30/2018)   - BP okay, hold ARB and HCTZ due to elevated creatinine as below      High cholesterol (3/30/2018)    - continue statin      Elevated serum creatinine (3/30/2018)   - baseline creatinine from over a year ago was ~0.9, up today, may represent dehydration with mild [place] vs new baseline after partial nephrectomy   - follow on IV fluids      Hyperglycemia (3/30/2018)   - elevated in past with normal A1c, recheck this visit   - may be an issue if she requires steroids, follow FBS daily      Code status:   - patient is FULL CODE, no AMD is on file, son Johny Dumont is he surrogate      Total time spent with patient: [address] discussed with: Patient, Family, Nursing Staff and Dr. Darrall Kocher    Discussed:  Code Status, Care Plan and D/C Planning    Prophylaxis:  Hep SQ and SCD's    Probable Disposition:  SNF/LTC and  PT, OT, RN           ___________________________________________________    Attending Physician: Gunjan Valle MD

## 2018-03-31 PROBLEM — M19.90 ACUTE ARTHRITIS: Status: ACTIVE | Noted: 2018-03-31

## 2018-03-31 PROBLEM — R33.9 URINARY RETENTION: Status: ACTIVE | Noted: 2018-03-31

## 2018-03-31 LAB
ANION GAP SERPL CALC-SCNC: 9 MMOL/L (ref 5–15)
ATRIAL RATE: 83 BPM
BUN SERPL-MCNC: 30 MG/DL (ref 6–20)
BUN/CREAT SERPL: 31 (ref 12–20)
CALCIUM SERPL-MCNC: 10.3 MG/DL (ref 8.5–10.1)
CALCULATED P AXIS, ECG09: 60 DEGREES
CALCULATED R AXIS, ECG10: -23 DEGREES
CALCULATED T AXIS, ECG11: 63 DEGREES
CHLORIDE SERPL-SCNC: 105 MMOL/L (ref 97–108)
CO2 SERPL-SCNC: 26 MMOL/L (ref 21–32)
CREAT SERPL-MCNC: 0.98 MG/DL (ref 0.55–1.02)
DIAGNOSIS, 93000: NORMAL
ERYTHROCYTE [DISTWIDTH] IN BLOOD BY AUTOMATED COUNT: 13 % (ref 11.5–14.5)
EST. AVERAGE GLUCOSE BLD GHB EST-MCNC: 103 MG/DL
GLUCOSE SERPL-MCNC: 158 MG/DL (ref 65–100)
HBA1C MFR BLD: 5.2 % (ref 4.2–6.3)
HCT VFR BLD AUTO: 31.7 % (ref 35–47)
HGB BLD-MCNC: 10.4 G/DL (ref 11.5–16)
MAGNESIUM SERPL-MCNC: 1.6 MG/DL (ref 1.6–2.4)
MCH RBC QN AUTO: 30.7 PG (ref 26–34)
MCHC RBC AUTO-ENTMCNC: 32.8 G/DL (ref 30–36.5)
MCV RBC AUTO: 93.5 FL (ref 80–99)
NRBC # BLD: 0 K/UL (ref 0–0.01)
NRBC BLD-RTO: 0 PER 100 WBC
P-R INTERVAL, ECG05: 158 MS
PHOSPHATE SERPL-MCNC: 2.5 MG/DL (ref 2.6–4.7)
PLATELET # BLD AUTO: 178 K/UL (ref 150–400)
PMV BLD AUTO: 10 FL (ref 8.9–12.9)
POTASSIUM SERPL-SCNC: 3.8 MMOL/L (ref 3.5–5.1)
Q-T INTERVAL, ECG07: 378 MS
QRS DURATION, ECG06: 92 MS
QTC CALCULATION (BEZET), ECG08: 444 MS
RBC # BLD AUTO: 3.39 M/UL (ref 3.8–5.2)
SODIUM SERPL-SCNC: 140 MMOL/L (ref 136–145)
VENTRICULAR RATE, ECG03: 83 BPM
WBC # BLD AUTO: 7.7 K/UL (ref 3.6–11)

## 2018-03-31 PROCEDURE — 74011250637 HC RX REV CODE- 250/637: Performed by: INTERNAL MEDICINE

## 2018-03-31 PROCEDURE — 36415 COLL VENOUS BLD VENIPUNCTURE: CPT | Performed by: INTERNAL MEDICINE

## 2018-03-31 PROCEDURE — 96372 THER/PROPH/DIAG INJ SC/IM: CPT

## 2018-03-31 PROCEDURE — 97530 THERAPEUTIC ACTIVITIES: CPT

## 2018-03-31 PROCEDURE — 97161 PT EVAL LOW COMPLEX 20 MIN: CPT

## 2018-03-31 PROCEDURE — 83036 HEMOGLOBIN GLYCOSYLATED A1C: CPT | Performed by: INTERNAL MEDICINE

## 2018-03-31 PROCEDURE — G8979 MOBILITY GOAL STATUS: HCPCS

## 2018-03-31 PROCEDURE — G8978 MOBILITY CURRENT STATUS: HCPCS

## 2018-03-31 PROCEDURE — 85027 COMPLETE CBC AUTOMATED: CPT | Performed by: INTERNAL MEDICINE

## 2018-03-31 PROCEDURE — 99218 HC RM OBSERVATION: CPT

## 2018-03-31 PROCEDURE — 97116 GAIT TRAINING THERAPY: CPT

## 2018-03-31 PROCEDURE — 83735 ASSAY OF MAGNESIUM: CPT | Performed by: INTERNAL MEDICINE

## 2018-03-31 PROCEDURE — 80048 BASIC METABOLIC PNL TOTAL CA: CPT | Performed by: INTERNAL MEDICINE

## 2018-03-31 PROCEDURE — 96361 HYDRATE IV INFUSION ADD-ON: CPT

## 2018-03-31 PROCEDURE — 77030011943

## 2018-03-31 PROCEDURE — 74011250636 HC RX REV CODE- 250/636: Performed by: INTERNAL MEDICINE

## 2018-03-31 PROCEDURE — 51798 US URINE CAPACITY MEASURE: CPT

## 2018-03-31 PROCEDURE — 77030034850

## 2018-03-31 PROCEDURE — 84100 ASSAY OF PHOSPHORUS: CPT | Performed by: INTERNAL MEDICINE

## 2018-03-31 RX ORDER — TAMSULOSIN HYDROCHLORIDE 0.4 MG/1
0.4 CAPSULE ORAL DAILY
Status: DISCONTINUED | OUTPATIENT
Start: 2018-03-31 | End: 2018-04-01 | Stop reason: HOSPADM

## 2018-03-31 RX ADMIN — HEPARIN SODIUM 5000 UNITS: 5000 INJECTION, SOLUTION INTRAVENOUS; SUBCUTANEOUS at 06:45

## 2018-03-31 RX ADMIN — SODIUM CHLORIDE 75 ML/HR: 900 INJECTION, SOLUTION INTRAVENOUS at 01:06

## 2018-03-31 RX ADMIN — ANASTROZOLE 1 MG: 1 TABLET, COATED ORAL at 08:43

## 2018-03-31 RX ADMIN — HEPARIN SODIUM 5000 UNITS: 5000 INJECTION, SOLUTION INTRAVENOUS; SUBCUTANEOUS at 22:41

## 2018-03-31 RX ADMIN — HEPARIN SODIUM 5000 UNITS: 5000 INJECTION, SOLUTION INTRAVENOUS; SUBCUTANEOUS at 15:11

## 2018-03-31 RX ADMIN — Medication 5 ML: at 22:00

## 2018-03-31 RX ADMIN — DOCUSATE SODIUM 100 MG: 100 CAPSULE, LIQUID FILLED ORAL at 17:23

## 2018-03-31 RX ADMIN — SIMVASTATIN 40 MG: 20 TABLET, FILM COATED ORAL at 22:41

## 2018-03-31 RX ADMIN — OXYCODONE HYDROCHLORIDE AND ACETAMINOPHEN 1 TABLET: 5; 325 TABLET ORAL at 20:24

## 2018-03-31 RX ADMIN — TAMSULOSIN HYDROCHLORIDE 0.4 MG: 0.4 CAPSULE ORAL at 11:46

## 2018-03-31 RX ADMIN — Medication 5 ML: at 06:00

## 2018-03-31 RX ADMIN — SODIUM CHLORIDE 75 ML/HR: 900 INJECTION, SOLUTION INTRAVENOUS at 15:15

## 2018-03-31 RX ADMIN — DOCUSATE SODIUM 100 MG: 100 CAPSULE, LIQUID FILLED ORAL at 08:43

## 2018-03-31 NOTE — PROGRESS NOTES
Problem: Falls - Risk of  Goal: *Absence of Falls  Document Shoshana Fall Risk and appropriate interventions in the flowsheet.    Outcome: Progressing Towards Goal  Fall Risk Interventions:  Mobility Interventions: OT consult for ADLs, Patient to call before getting OOB, PT Consult for mobility concerns, PT Consult for assist device competence, Strengthening exercises (ROM-active/passive), Bed/chair exit alarm         Medication Interventions: Evaluate medications/consider consulting pharmacy, Bed/chair exit alarm, Patient to call before getting OOB, Teach patient to arise slowly    Elimination Interventions: Bed/chair exit alarm, Call light in reach, Toileting schedule/hourly rounds, Patient to call for help with toileting needs

## 2018-03-31 NOTE — PROGRESS NOTES
Problem: Mobility Impaired (Adult and Pediatric)  Goal: *Acute Goals and Plan of Care (Insert Text)  Physical Therapy Goals  Initiated 3/31/2018  1. Patient will move from supine to sit and sit to supine  and roll side to side in bed with independence within 7 day(s). 2.  Patient will transfer from bed to chair and chair to bed with independence using the least restrictive device within 7 day(s). 3.  Patient will perform sit to stand with independence within 7 day(s). 4.  Patient will ambulate with modified independence for 500 feet with the least restrictive device within 7 day(s). physical Therapy EVALUATION  Patient: Vincenzo Britton (13 y.o. female)  Date: 3/31/2018  Primary Diagnosis: Gout        Precautions:   Fall    ASSESSMENT :  Based on the objective data described below, the patient presents with generalized weakness, impaired ROM(d/t pain), impaired standing balance and increased fall risk following admission for gout. Pt's daughter present throughout evaluation. PTA pt was living at [address] [place] [address], ambulating with a rollator and receiving PT/OT on site. Pt limited by painful arthritis throughout shoulders, knees, and back limiting overall functional mobility. With the exception of bed mobility and initial arise to stand pt requiring no physical assistance and displays good balance during gait training with rollator. Pt did requiring minimal A to come to a seated position and Mod A to stand from the bed. Removed wrist immobilizer and L shoulder sling as both were inhibiting mobility and only used for pain control. Pt ambulated nearly 300ft with SBA prior to returning to room and sitting up in the recliner with daughter present. Pt was recently hospitalized for surgery Mon-Wed at another hospital and her daughter has been staying with her since. Pt would benefit from continued HHPT/OT services at discharge and initial family assistance for transfers until pt is at a mod-I level.  If family unable to provide assistance pt may need a short rehab stay prior to returnign to [facility] however family and pt hopeful to transition directly back to [facility] once pain is under control. Patient will benefit from skilled intervention to address the above impairments. Patients rehabilitation potential is considered to be Good  Factors which may influence rehabilitation potential include:   []         None noted  []         Mental ability/status  []         Medical condition  []         Home/family situation and support systems  []         Safety awareness  [x]         Pain tolerance/management  []         Other:      PLAN :  Recommendations and Planned Interventions:  [x]           Bed Mobility Training             [x]    Neuromuscular Re-Education  [x]           Transfer Training                   []    Orthotic/Prosthetic Training  [x]           Gait Training                         []    Modalities  [x]           Therapeutic Exercises           []    Edema Management/Control  [x]           Therapeutic Activities            [x]    Patient and Family Training/Education  []           Other (comment):    Frequency/Duration: Patient will be followed by physical therapy  5 times a week to address goals. Discharge Recommendations: Home Health  Further Equipment Recommendations for Discharge: None     SUBJECTIVE:   Patient stated I have trigger finger and the splints work well.     OBJECTIVE DATA SUMMARY:   HISTORY:    Past Medical History:   Diagnosis Date    High cholesterol     Hypertension     Ovarian cancer ([facility] [address]      Past Surgical History:   Procedure Laterality Date    HX HYSTERECTOMY      HX KNEE REPLACEMENT      right 2010    HX SHOULDER REPLACEMENT      right 2011     Prior Level of Function/Home Situation: [facility], uses rollator, family supportive  Personal factors and/or comorbidities impacting plan of care: HTN, trigger finger, arthritis    Home Situation  Home Environment: Independent living ([facility])  # Steps to Enter: 0  One/Two Story Residence: One story  Living Alone: Yes  Support Systems: Family member(s), Child(stephanie)  Patient Expects to be Discharged to[de-identified]  ([facility])  Current DME Used/Available at Home: Grab bars, Raised toilet seat, Walker, rollator  Tub or Shower Type: Shower    EXAMINATION/PRESENTATION/DECISION MAKING:   Critical Behavior:  Neurologic State: Alert  Orientation Level: Oriented X4  Cognition: Appropriate decision making, Appropriate for age attention/concentration, Appropriate safety awareness, Follows commands     Hearing: Auditory  Auditory Impairment: Hearing aid(s) (bilateral hearing aids)  Hearing Aids/Status: At home    Range Of Motion:  AROM: Generally decreased, functional (trigger finger R middle finger L thumb)           PROM: Generally decreased, functional           Strength:    Strength: Generally decreased, functional                    Tone & Sensation:   Tone: Normal              Sensation: Intact               Coordination:  Coordination: Within functional limits  Vision:      Functional Mobility:  Bed Mobility:  Rolling: Minimum assistance  Supine to Sit: Minimum assistance        Transfers:  Sit to Stand: Moderate assistance  Stand to Sit: Contact guard assistance                       Balance:   Sitting: Intact  Standing: Impaired; Without support  Standing - Static: Good;Constant support (rollator)  Standing - Dynamic : Fair  Ambulation/Gait Training:  Distance (ft): 275 Feet (ft)  Assistive Device: Gait belt;Walker, rollator  Ambulation - Level of Assistance: Stand-by assistance        Gait Abnormalities: Decreased step clearance (forward flexed)        Base of Support: Narrowed     Speed/Beulah: Pace decreased (<100 feet/min)  Step Length: Right shortened;Left shortened              Functional Measure:  Tinetti test:    Sitting Balance: 1  Arises: 0  Attempts to Rise: 1  Immediate Standing Balance: 1  Standing Balance: 1  Nudged: 1  Eyes Closed: 1  Turn 360 Degrees - Continuous/Discontinuous: 1  Turn 360 Degrees - Steady/Unsteady: 1  Sitting Down: 1  Balance Score: 9  Indication of Gait: 1  R Step Length/Height: 1  L Step Length/Height: 1  R Foot Clearance: 1  L Foot Clearance: 1  Step Symmetry: 1  Step Continuity: 1  Path: 1  Trunk: 1  Walking Time: 0  Gait Score: 9  Total Score: 18       Tinetti Test and G-code impairment scale:  Percentage of Impairment CH    0%   CI    1-19% CJ    20-39% CK    40-59% CL    60-79% CM    80-99% CN     100%   Tinetti  Score 0-28 28 23-27 17-22 12-16 6-11 1-5 0       Tinetti Tool Score Risk of Falls  <19 = High Fall Risk  19-24 = Moderate Fall Risk  25-28 = Low Fall Risk  Tinetti ME. Performance-Oriented Assessment of Mobility Problems in Elderly Patients. [address]; [id number]. (Scoring Description: PT Bulletin Feb. 10, 1993)    Older adults: Valentin Elizalde et al, 2009; n = 1000 [facility] elderly evaluated with ABC, BETH, ADL, and IADL)  · Mean BETH score for males aged 69-68 years = 26.21(3.40)  · Mean BETH score for females age 69-68 years = 25.16(4.30)  · Mean BETH score for males over 80 years = 23.29(6.02)  · Mean BETH score for females over 80 years = 17.20(8.32)         G codes: In compliance with CMSs Claims Based Outcome Reporting, the following G-code set was chosen for this patient based on their primary functional limitation being treated: The outcome measure chosen to determine the severity of the functional limitation was the Tinetti with a score of 18/28 which was correlated with the impairment scale.     ? Mobility - Walking and Moving Around:     - CURRENT STATUS: CJ - 20%-39% impaired, limited or restricted    - GOAL STATUS: CI - 1%-19% impaired, limited or restricted    - D/C STATUS:  ---------------To be determined---------------      Physical Therapy Evaluation Charge Determination   History Examination Presentation Decision-Making   MEDIUM  Complexity : 1-2 comorbidities / personal factors will impact the outcome/ POC  MEDIUM Complexity : 3 Standardized tests and measures addressing body structure, function, activity limitation and / or participation in recreation  LOW Complexity : Stable, uncomplicated  Other outcome measures Tinetti  LOW       Based on the above components, the patient evaluation is determined to be of the following complexity level: LOW     Pain:  Pain Scale 1: Numeric (0 - 10)  Pain Intensity 1: 0              Activity Tolerance:   Good  Please refer to the flowsheet for vital signs taken during this treatment. After treatment:   [x]         Patient left in no apparent distress sitting up in chair  []         Patient left in no apparent distress in bed  [x]         Call bell left within reach  [x]         Nursing notified  [x]         Caregiver present  []         Bed alarm activated    COMMUNICATION/EDUCATION:   The patients plan of care was discussed with: Registered Nurse. [x]         Fall prevention education was provided and the patient/caregiver indicated understanding. [x]         Patient/family have participated as able in goal setting and plan of care. [x]         Patient/family agree to work toward stated goals and plan of care. []         Patient understands intent and goals of therapy, but is neutral about his/her participation. []         Patient is unable to participate in goal setting and plan of care.     Thank you for this referral.  Kary Fernandez, PT   Time Calculation: 46 mins

## 2018-03-31 NOTE — PROGRESS NOTES
Notified Dr. Abdulkadir Pitt via telephone that pt still retaining urine. Bladder scan showed 700mL in bladder. Pt was straight cathed once last night. Per Dr. Abdulkadir Pitt- insert fitzgerald catheter for urinary retention.

## 2018-03-31 NOTE — PROGRESS NOTES
Ortho Progress Note        S:  Pt. Sitting up in chair, L knee flexed. States pain has improved. O:  L knee with flexion and extension with minimal pain. Pt. Ambulated in room with walker. No erythema noted. Small effusion. L wrist improved edema, moderate effusion. Pain improved. No erythema. L shoulder with improved ROM, no erythema. A:  Polyarthragia    P:  Pt. Improved with cortisone injection to L knee. Seems to be stable with PT. L wrist improved, wear splint when able for next week. Follow up with Dr. Janeth Swenson who she's known to in 10-14 days. If shoulder still bothering her, may consider injection at that time. Dr. Mike Foy agrees with plan. Reconsult as needed. Thanks. Thank you for allowing us to take part in this patients care.     CHRIS Lawson-C  Orthopaedic Surgery PA

## 2018-03-31 NOTE — PROGRESS NOTES
Gustavo Donaldson [facility] [address]  [address] [facility], [address] [place] [address]  [phone]      Medical Progress Note      NAME: Barbara Escamilla   :  1931  MRM:  [id number]    Date/Time: 3/31/2018  9:55 AM         Subjective:     Chief Complaint:  Pain: left knee, much improved, mild to moderate, better with oral pain meds overnight and better after injection. Did have difficulty voiding on bedpan earlier, had to be straight cathed several times and now has Hooper    ROS:  (bold if positive, if negative)                        Tolerating PT  Tolerating Diet          Objective:       Vitals:          Last 24hrs VS reviewed since prior progress note.  Most recent are:    Visit Vitals    /82 (BP 1 Location: Right arm, BP Patient Position: At rest)    Pulse 68    Temp 97.7 °F (36.5 °C)    Resp 15    Ht 5' 1\" (1.549 m)    Wt 60.3 kg (133 lb)    SpO2 97%    Breastfeeding No    BMI 25.13 kg/m2     SpO2 Readings from Last 6 Encounters:   18 97%   16 97%          Intake/Output Summary (Last 24 hours) at 18 0955  Last data filed at 18 2249   Gross per 24 hour   Intake           2402.5 ml   Output             1875 ml   Net            527.5 ml          Exam:     Physical Exam:    Gen:  Well-developed, well-nourished, frail, elderly, in no acute distress  HEENT:  Pink conjunctivae, PERRL, hearing intact to voice, moist mucous membranes  Neck:  Supple, without masses, thyroid non-tender  Resp:  No accessory muscle use, clear breath sounds without wheezes rales or rhonchi  Card:  No murmurs, normal S1, S2 without thrills, bruits or peripheral edema  Abd:  Soft, non-tender, non-distended, normoactive bowel sounds are present, no palpable organomegaly and no detectable hernias  Lymph:  No cervical or inguinal adenopathy  Musc:  No cyanosis or clubbing, left knee with mild swelling and tenderness, improved from yesteday  Skin:  No rashes or ulcers, skin turgor is good  Neuro: Cranial nerves are grossly intact, no focal motor weakness, follows commands appropriately  Psych:  Good insight, oriented to person, place and time, alert       Medications Reviewed: (see below)    Lab Data Reviewed: (see below)    ______________________________________________________________________    Medications:     Current Facility-Administered Medications   Medication Dose Route Frequency    tamsulosin (FLOMAX) capsule 0.4 mg  0.4 mg Oral DAILY    0.9% sodium chloride infusion  75 mL/hr IntraVENous CONTINUOUS    sodium chloride (NS) flush 5-10 mL  5-10 mL IntraVENous Q8H    sodium chloride (NS) flush 5-10 mL  5-10 mL IntraVENous PRN    acetaminophen (TYLENOL) tablet 650 mg  650 mg Oral Q4H PRN    oxyCODONE-acetaminophen (PERCOCET) 5-325 mg per tablet 1 Tab  1 Tab Oral Q4H PRN    HYDROmorphone (PF) (DILAUDID) injection 0.5 mg  0.5 mg IntraVENous Q4H PRN    prochlorperazine (COMPAZINE) injection 10 mg  10 mg IntraVENous Q6H PRN    heparin (porcine) injection 5,000 Units  5,000 Units SubCUTAneous Q8H    anastrozole (ARIMIDEX) tablet 1 mg  1 mg Oral DAILY    docusate sodium (COLACE) capsule 100 mg  100 mg Oral BID    simvastatin (ZOCOR) tablet 40 mg  40 mg Oral QHS            Lab Review:     Recent Labs      03/31/18   0257  03/30/18   0913   WBC  7.7  10.6   HGB  10.4*  10.6*   HCT  31.7*  32.5*   PLT  178  188     Recent Labs      03/31/18   0257  03/30/18   0913   NA  140  138   K  3.8  3.6   CL  105  100   CO2  26  27   GLU  158*  133*   BUN  30*  40*   CREA  0.98  1.23*   CA  10.3*  10.8*   MG  1.6   --    PHOS  2.5*   --    ALB   --   3.0*   TBILI   --   0.9   SGOT   --   20   ALT   --   17     Lab Results   Component Value Date/Time    Glucose (POC) 109 (H) 01/24/2011 06:39 AM     No results for input(s): PH, PCO2, PO2, HCO3, FIO2 in the last 72 hours. No results for input(s): INR in the last 72 hours.     No lab exists for component: INREXT  Lab Results   Component Value Date/Time    Specimen Description: URINE 04/05/2010 03:45 PM    Specimen Description: NARES 04/05/2010 02:25 PM     Lab Results   Component Value Date/Time    Culture result: PENDING 03/30/2018 11:17 AM    Culture result: MIXED UROGENITAL SHIKHA ISOLATED 04/05/2010 03:45 PM    Culture result:  04/05/2010 02:25 PM     MRSA NOT PRESENT      Screening of patient nares for MRSA is for surveillance purposes and, if positive, to facilitate isolation considerations in high risk settings. It is not intended for automatic decolonization interventions per se as regimens are not sufficiently effective to warrant routine use.             Assessment:     Principal Problem:    Acute arthritis (3/31/2018)    Active Problems:    HTN (hypertension) (3/30/2018)      High cholesterol (3/30/2018)      Elevated serum creatinine (3/30/2018)      Hyperglycemia (3/30/2018)      Leukocytosis (3/30/2018)      Urinary retention (3/31/2018)           Plan:     Principal Problem:    Acute arthritis (3/31/2018)   - gout vs other inflammtory arthritis   - does NOT appear to be septic arthritis   - much improved after steroid injection   - mobilize with PT/OT    Active Problems:    HTN (hypertension) (3/30/2018)   - BP okay on meds as above      High cholesterol (3/30/2018)   - continue statin      Elevated serum creatinine (3/30/2018)   - creatinine improved with IV fluids, mild HAMILTON due to dehydration POA      Hyperglycemia (3/30/2018)   - A1c normal, likely due to stress, up more this AM likely due to steroids      Urinary retention (3/31/2018)   - Hooper placed this AM, patient and daughter think this is more mobility related   - she is much more mobile this AM   - will give Flomax and see if Hooper can come out later this afternoon      Total time spent with patient: 25 minutes                  Care Plan discussed with: Patient, Family and Nursing Staff    Discussed:  Code Status, Care Plan and D/C Planning    Prophylaxis:  Hep SQ and SCD's    Disposition:   PT, OT, RN           ___________________________________________________    Attending Physician: Georgia Whaley MD

## 2018-03-31 NOTE — ROUTINE PROCESS
Bedside and Verbal shift change report given to Jolie Pryor RN (oncoming nurse) by Albert Hernández RN (offgoing nurse). Report included the following information SBAR, Kardex, Intake/Output, MAR and Recent Results.

## 2018-04-01 ENCOUNTER — HOME HEALTH ADMISSION (OUTPATIENT)
Dept: HOME HEALTH SERVICES | Facility: HOME HEALTH | Age: 83
End: 2018-04-01
Payer: MEDICARE

## 2018-04-01 VITALS
HEART RATE: 63 BPM | TEMPERATURE: 97.7 F | HEIGHT: 61 IN | DIASTOLIC BLOOD PRESSURE: 63 MMHG | RESPIRATION RATE: 16 BRPM | BODY MASS INDEX: 25.11 KG/M2 | OXYGEN SATURATION: 96 % | SYSTOLIC BLOOD PRESSURE: 137 MMHG | WEIGHT: 133 LBS

## 2018-04-01 PROCEDURE — 51798 US URINE CAPACITY MEASURE: CPT

## 2018-04-01 PROCEDURE — 96361 HYDRATE IV INFUSION ADD-ON: CPT

## 2018-04-01 PROCEDURE — 74011250637 HC RX REV CODE- 250/637: Performed by: INTERNAL MEDICINE

## 2018-04-01 PROCEDURE — 74011250636 HC RX REV CODE- 250/636: Performed by: INTERNAL MEDICINE

## 2018-04-01 PROCEDURE — 96372 THER/PROPH/DIAG INJ SC/IM: CPT

## 2018-04-01 PROCEDURE — 99218 HC RM OBSERVATION: CPT

## 2018-04-01 RX ORDER — AMOXICILLIN 250 MG
1 CAPSULE ORAL 2 TIMES DAILY
Qty: 60 TAB | Refills: 0 | Status: SHIPPED | OUTPATIENT
Start: 2018-04-01 | End: 2020-09-21

## 2018-04-01 RX ORDER — OXYCODONE AND ACETAMINOPHEN 5; 325 MG/1; MG/1
1 TABLET ORAL
Qty: 20 TAB | Refills: 0 | Status: SHIPPED | OUTPATIENT
Start: 2018-04-01 | End: 2020-09-21

## 2018-04-01 RX ORDER — TAMSULOSIN HYDROCHLORIDE 0.4 MG/1
0.4 CAPSULE ORAL DAILY
Qty: 7 CAP | Refills: 0 | Status: SHIPPED | OUTPATIENT
Start: 2018-04-02 | End: 2018-04-09

## 2018-04-01 RX ADMIN — HEPARIN SODIUM 5000 UNITS: 5000 INJECTION, SOLUTION INTRAVENOUS; SUBCUTANEOUS at 06:57

## 2018-04-01 RX ADMIN — TAMSULOSIN HYDROCHLORIDE 0.4 MG: 0.4 CAPSULE ORAL at 08:52

## 2018-04-01 RX ADMIN — SODIUM CHLORIDE 75 ML/HR: 900 INJECTION, SOLUTION INTRAVENOUS at 07:05

## 2018-04-01 RX ADMIN — DOCUSATE SODIUM 100 MG: 100 CAPSULE, LIQUID FILLED ORAL at 08:52

## 2018-04-01 RX ADMIN — Medication 5 ML: at 06:00

## 2018-04-01 NOTE — PROGRESS NOTES
Pt dc home with instructions both written and verbal,pt acknowledges understanding, no s/s of distress noted,no c/o pain at this time. piv removed prior to dc.

## 2018-04-01 NOTE — DISCHARGE INSTRUCTIONS
HOSPITALIST DISCHARGE INSTRUCTIONS  NAME: Vincenzo Britton   :  1931   MRN:  [id number]     Date/Time:  2018 8:57 AM    ADMIT DATE: 3/30/2018     DISCHARGE DATE: 2018     DISCHARGE DIAGNOSIS:  Acute arthritis    MEDICATIONS:  · It is important that you take the medication exactly as they are prescribed. · Keep your medication in the bottles provided by the pharmacist and keep a list of the medication names, dosages, and times to be taken in your wallet. · Do not take other medications without consulting your doctor. Pain Management: per above medications    What to do at Home    Recommended diet:  Regular Diet    Recommended activity: Activity as tolerated    If you experience any of the following symptoms then please call your primary care physician or return to the emergency room if you cannot get hold of your doctor:  Fever, chills, nausea, vomiting, diarrhea, change in mentation, falling, bleeding, shortness of breath    Follow Up: Follow-up Information     Follow up With Details Comments Contact Info    Tushar Wright MD In 2 weeks  [address]      Sabrina Montes [address] [place] [address]   [address] [facility] [address] [phone]              Information obtained by :  I understand that if any problems occur once I am at home I am to contact my physician. I understand and acknowledge receipt of the instructions indicated above.                                                                                                                                            Physician's or R.N.'s Signature                                                                  Date/Time                                                                                                                                              Patient or Representative Signature                                                          Date/Time

## 2018-04-01 NOTE — PROGRESS NOTES
Problem: Falls - Risk of  Goal: *Absence of Falls  Document Shoshana Fall Risk and appropriate interventions in the flowsheet.    Outcome: Progressing Towards Goal  Fall Risk Interventions:  Mobility Interventions: Bed/chair exit alarm, Patient to call before getting OOB, PT Consult for mobility concerns, OT consult for ADLs, PT Consult for assist device competence, Strengthening exercises (ROM-active/passive)         Medication Interventions: Bed/chair exit alarm, Patient to call before getting OOB, Teach patient to arise slowly    Elimination Interventions: Bed/chair exit alarm, Call light in reach

## 2018-04-01 NOTE — PROGRESS NOTES
12:12 PM 04/01/18   CM called pt for home care referral [phone], pt already discharged home. CM spoke to Pt and daughter, agreed to home services with [facility]. Referral sent thru [facility] and also spoke to Sylvia michaels at on call intake. Will await return call to see if pt is accepted. PT given [facility] of choice for home care.   PCP Rc Townsend, no NN  1:35 PM   04/01/18   Call back from [address] [place] [address] from [facility] can accept patient for home care services

## 2018-04-01 NOTE — DISCHARGE SUMMARY
Physician Discharge Summary     Patient ID:  Davie Nam  [id number]  63 y.o.  5/7/1931    Admit date: 3/30/2018    Discharge date: 4/1/2018    Admission Diagnoses: Gout    Discharge Diagnoses:  Principal Diagnosis Acute arthritis                                            Principal Problem:    Acute arthritis (3/31/2018)    Active Problems:    HTN (hypertension) (3/30/2018)      High cholesterol (3/30/2018)      Elevated serum creatinine (3/30/2018)      Hyperglycemia (3/30/2018)      Urinary retention (3/31/2018)         Resolved Problems:  Problem List as of 4/1/2018  Date Reviewed: 3/30/2018          Codes Class Noted - Resolved    Urinary retention ICD-10-CM: R33.9  ICD-9-CM: 788.20  3/31/2018 - Present        * (Principal)Acute arthritis ICD-10-CM: M19.90  ICD-9-CM: 716.90  3/31/2018 - Present        HTN (hypertension) (Chronic) ICD-10-CM: I10  ICD-9-CM: 401.9  3/30/2018 - Present        High cholesterol (Chronic) ICD-10-CM: E78.00  ICD-9-CM: 272.0  3/30/2018 - Present        Elevated serum creatinine ICD-10-CM: R79.89  ICD-9-CM: 790.99  3/30/2018 - Present        Hyperglycemia ICD-10-CM: R73.9  ICD-9-CM: 790.29  3/30/2018 - Present                Hospital Course:   Ms. Cara Hutson was admitted to the Hospitalist Service on the 5th floor for treatment of acute arthritis. She underwent arthrocentesis in the ED which returned consistent with inflammatory arthritis but did not show crystals. She was evaluated by Orthopedics who felt gout or pseudogout were likely and performed a steroid injection in to the left knee. She greatly improved. Pain was controlled with oral pain medications, she never required IV pain meds. She was mobilized with PT/OT and did quite well. She did initially have difficulty voiding and developed urinary retention. A Hooper catheter was placed and she was started on Flomax, however once she was mobilized, they Hooper was removed without any difficulty voiding.     She was discharged home on 4/1/2018 in improved condition. PCP: Alisa Ly MD    Consults: Orthopedic Surgery    Discharge Exam:  See my Progress Note from today. Disposition: home    Patient Instructions:   Current Discharge Medication List      START taking these medications    Details   tamsulosin (FLOMAX) 0.4 mg capsule Take 1 Cap by mouth daily for 7 days. Qty: 7 Cap, Refills: 0      oxyCODONE-acetaminophen (PERCOCET) 5-325 mg per tablet Take 1 Tab by mouth every four (4) hours as needed. Max Daily Amount: 6 Tabs. Qty: 20 Tab, Refills: 0    Associated Diagnoses: Acute pain of left knee; Acute arthritis      senna-docusate (PERICOLACE) 8.6-50 mg per tablet Take 1 Tab by mouth two (2) times a day. To prevent constipation while taking pain medicines. Do not take if you have diarrhea. Qty: 60 Tab, Refills: 0         CONTINUE these medications which have NOT CHANGED    Details   anastrozole (ARIMIDEX) 1 mg tablet Take 1 mg by mouth daily. losartan-hydroCHLOROthiazide (HYZAAR) 100-25 mg per tablet Take 1 Tab by mouth daily. cholecalciferol, vitamin D3, (VITAMIN D3) 2,000 unit tab Take 1 Tab by mouth daily. simvastatin (ZOCOR) 40 mg tablet Take 40 mg by mouth nightly. LECITHIN PO Take 1,200 mg by mouth daily. TRAMADOL HCL (TRAMADOL PO) Take 50 mg by mouth two (2) times a day. STOP taking these medications       DOCUSATE CALCIUM (STOOL SOFTENER PO) Comments:   Reason for Stopping:              Activity: Activity as tolerated  Diet: Cardiac Diet  Wound Care: None needed    Follow-up Information     Follow up With Details Comments Contact Info    Florencio Lawson MD In 2 weeks  [address]      Kevin Mendez MD   [address] [place] [address]  [phone]            35 minutes were spent on this discharge.     Signed:  Michelle Page MD  4/1/2018  9:32 AM

## 2018-04-01 NOTE — PROGRESS NOTES
Gustavo Donaldson luli [address] 79  [address] [facility], [address] [place] [address]  [phone]      Medical Progress Note      NAME: Alem Manzano   :  1931  MRM:  [id number]    Date/Time: 2018  9:11 AM          Subjective:     Chief Complaint:  Pain: left knee, much improved, mild, better with oral pain meds and better since injection. Hooper out last night with no problems voiding    ROS:  (bold if positive, if negative)                        Tolerating PT  Tolerating Diet          Objective:       Vitals:          Last 24hrs VS reviewed since prior progress note.  Most recent are:    Visit Vitals    /63 (BP 1 Location: Right arm, BP Patient Position: At rest)    Pulse 63    Temp 97.7 °F (36.5 °C)    Resp 16    Ht 5' 1\" (1.549 m)    Wt 60.3 kg (133 lb)    SpO2 96%    Breastfeeding No    BMI 25.13 kg/m2     SpO2 Readings from Last 6 Encounters:   18 96%   16 97%            Intake/Output Summary (Last 24 hours) at 18 0910  Last data filed at 18 0336   Gross per 24 hour   Intake           1467.5 ml   Output             1050 ml   Net            417.5 ml          Exam:     Physical Exam:    Gen:  Well-developed, well-nourished, frail, elderly, in no acute distress  HEENT:  Pink conjunctivae, PERRL, hearing intact to voice, moist mucous membranes  Neck:  Supple, without masses, thyroid non-tender  Resp:  No accessory muscle use, clear breath sounds without wheezes rales or rhonchi  Card:  No murmurs, normal S1, S2 without thrills, bruits or peripheral edema  Abd:  Soft, non-tender, non-distended, normoactive bowel sounds are present, no palpable organomegaly and no detectable hernias  Lymph:  No cervical or inguinal adenopathy  Musc:  No cyanosis or clubbing, left knee with mild swelling and tenderness, improved from yesteday  Skin:  No rashes or ulcers, skin turgor is good  Neuro:  Cranial nerves are grossly intact, no focal motor weakness, follows commands appropriately  Psych:  Good insight, oriented to person, place and time, alert       Medications Reviewed: (see below)    Lab Data Reviewed: (see below)    ______________________________________________________________________    Medications:     Current Facility-Administered Medications   Medication Dose Route Frequency    tamsulosin (FLOMAX) capsule 0.4 mg  0.4 mg Oral DAILY    0.9% sodium chloride infusion  75 mL/hr IntraVENous CONTINUOUS    sodium chloride (NS) flush 5-10 mL  5-10 mL IntraVENous Q8H    sodium chloride (NS) flush 5-10 mL  5-10 mL IntraVENous PRN    acetaminophen (TYLENOL) tablet 650 mg  650 mg Oral Q4H PRN    oxyCODONE-acetaminophen (PERCOCET) 5-325 mg per tablet 1 Tab  1 Tab Oral Q4H PRN    HYDROmorphone (PF) (DILAUDID) injection 0.5 mg  0.5 mg IntraVENous Q4H PRN    prochlorperazine (COMPAZINE) injection 10 mg  10 mg IntraVENous Q6H PRN    heparin (porcine) injection 5,000 Units  5,000 Units SubCUTAneous Q8H    anastrozole (ARIMIDEX) tablet 1 mg  1 mg Oral DAILY    docusate sodium (COLACE) capsule 100 mg  100 mg Oral BID    simvastatin (ZOCOR) tablet 40 mg  40 mg Oral QHS            Lab Review:     Recent Labs      03/31/18 0257  03/30/18   0913   WBC  7.7  10.6   HGB  10.4*  10.6*   HCT  31.7*  32.5*   PLT  178  188     Recent Labs      03/31/18   0257  03/30/18   0913   NA  140  138   K  3.8  3.6   CL  105  100   CO2  26  27   GLU  158*  133*   BUN  30*  40*   CREA  0.98  1.23*   CA  10.3*  10.8*   MG  1.6   --    PHOS  2.5*   --    ALB   --   3.0*   TBILI   --   0.9   SGOT   --   20   ALT   --   17     Lab Results   Component Value Date/Time    Glucose (POC) 109 (H) 01/24/2011 06:39 AM     No results for input(s): PH, PCO2, PO2, HCO3, FIO2 in the last 72 hours. No results for input(s): INR in the last 72 hours.     No lab exists for component: Janice Opitz  Lab Results   Component Value Date/Time    Specimen Description: URINE 04/05/2010 03:45 PM    Specimen Description: NARES 04/05/2010 02:25 PM     Lab Results   Component Value Date/Time    Culture result: No growth thus far, holding 14 days. 03/30/2018 11:17 AM    Culture result: MIXED UROGENITAL SHIKHA ISOLATED 04/05/2010 03:45 PM    Culture result:  04/05/2010 02:25 PM     MRSA NOT PRESENT      Screening of patient nares for MRSA is for surveillance purposes and, if positive, to facilitate isolation considerations in high risk settings. It is not intended for automatic decolonization interventions per se as regimens are not sufficiently effective to warrant routine use.             Assessment:     Principal Problem:    Acute arthritis (3/31/2018)    Active Problems:    HTN (hypertension) (3/30/2018)      High cholesterol (3/30/2018)      Elevated serum creatinine (3/30/2018)      Hyperglycemia (3/30/2018)      Urinary retention (3/31/2018)           Plan:     Principal Problem:    Acute arthritis (3/31/2018)   - gout vs other inflammtory arthritis   - does NOT appear to be septic arthritis   - much improved after steroid injection   - mobilized with PT/OT, home today with home PT/OT on oral pain meds, splint, etc per Ortho    Active Problems:    HTN (hypertension) (3/30/2018)   - BP okay on meds as above      High cholesterol (3/30/2018)   - continue statin      Elevated serum creatinine (3/30/2018)   - creatinine improved with IV fluids, mild HAMILTON due to dehydration POA      Hyperglycemia (3/30/2018)   - A1c normal, likely due to stress and steroids      Urinary retention (3/31/2018)   - Hooper removed without problems   - continue Flomax for one week while on pain meds and less mobile      Total time spent with patient: 35 minutes                  Care Plan discussed with: Patient, Family and Nursing Staff    Discussed:  Code Status, Care Plan and D/C Planning    Prophylaxis:  Hep SQ and SCD's    Disposition:   PT, OT, RN           ___________________________________________________    Attending Physician: Marylene Police, MD

## 2018-04-01 NOTE — ROUTINE PROCESS
Bedside and Verbal shift change report given to Lou Holman RN (oncoming nurse) by Danette Fonseca RN (offgoing nurse). Report included the following information SBAR, Kardex, Intake/Output and Recent Results.

## 2018-04-03 ENCOUNTER — HOME CARE VISIT (OUTPATIENT)
Dept: SCHEDULING | Facility: HOME HEALTH | Age: 83
End: 2018-04-03
Payer: MEDICARE

## 2018-04-03 PROCEDURE — 3331090001 HH PPS REVENUE CREDIT

## 2018-04-03 PROCEDURE — G0151 HHCP-SERV OF PT,EA 15 MIN: HCPCS

## 2018-04-03 PROCEDURE — 3331090002 HH PPS REVENUE DEBIT

## 2018-04-03 PROCEDURE — 400013 HH SOC

## 2018-04-04 PROCEDURE — 3331090002 HH PPS REVENUE DEBIT

## 2018-04-04 PROCEDURE — 3331090001 HH PPS REVENUE CREDIT

## 2018-04-05 ENCOUNTER — HOME CARE VISIT (OUTPATIENT)
Dept: SCHEDULING | Facility: HOME HEALTH | Age: 83
End: 2018-04-05
Payer: MEDICARE

## 2018-04-05 PROCEDURE — 3331090002 HH PPS REVENUE DEBIT

## 2018-04-05 PROCEDURE — G0151 HHCP-SERV OF PT,EA 15 MIN: HCPCS

## 2018-04-05 PROCEDURE — 3331090001 HH PPS REVENUE CREDIT

## 2018-04-06 PROCEDURE — 3331090001 HH PPS REVENUE CREDIT

## 2018-04-06 PROCEDURE — 3331090002 HH PPS REVENUE DEBIT

## 2018-04-07 PROCEDURE — 3331090001 HH PPS REVENUE CREDIT

## 2018-04-07 PROCEDURE — 3331090002 HH PPS REVENUE DEBIT

## 2018-04-08 PROCEDURE — 3331090001 HH PPS REVENUE CREDIT

## 2018-04-08 PROCEDURE — 3331090002 HH PPS REVENUE DEBIT

## 2018-04-09 ENCOUNTER — HOME CARE VISIT (OUTPATIENT)
Dept: SCHEDULING | Facility: HOME HEALTH | Age: 83
End: 2018-04-09
Payer: MEDICARE

## 2018-04-09 PROCEDURE — G0151 HHCP-SERV OF PT,EA 15 MIN: HCPCS

## 2018-04-09 PROCEDURE — 3331090002 HH PPS REVENUE DEBIT

## 2018-04-09 PROCEDURE — 3331090001 HH PPS REVENUE CREDIT

## 2018-04-09 PROCEDURE — G0152 HHCP-SERV OF OT,EA 15 MIN: HCPCS

## 2018-04-10 VITALS — HEART RATE: 66 BPM | SYSTOLIC BLOOD PRESSURE: 111 MMHG | DIASTOLIC BLOOD PRESSURE: 70 MMHG | OXYGEN SATURATION: 98 %

## 2018-04-10 PROCEDURE — 3331090002 HH PPS REVENUE DEBIT

## 2018-04-10 PROCEDURE — 3331090001 HH PPS REVENUE CREDIT

## 2018-04-11 PROCEDURE — 3331090001 HH PPS REVENUE CREDIT

## 2018-04-11 PROCEDURE — 3331090002 HH PPS REVENUE DEBIT

## 2018-04-12 ENCOUNTER — HOME CARE VISIT (OUTPATIENT)
Dept: SCHEDULING | Facility: HOME HEALTH | Age: 83
End: 2018-04-12
Payer: MEDICARE

## 2018-04-12 PROCEDURE — 3331090002 HH PPS REVENUE DEBIT

## 2018-04-12 PROCEDURE — 3331090001 HH PPS REVENUE CREDIT

## 2018-04-12 PROCEDURE — G0151 HHCP-SERV OF PT,EA 15 MIN: HCPCS

## 2018-04-13 LAB
BACTERIA SPEC CULT: NORMAL
GRAM STN SPEC: NORMAL
GRAM STN SPEC: NORMAL
SERVICE CMNT-IMP: NORMAL

## 2018-04-13 PROCEDURE — 3331090001 HH PPS REVENUE CREDIT

## 2018-04-13 PROCEDURE — 3331090002 HH PPS REVENUE DEBIT

## 2018-04-14 PROCEDURE — 3331090001 HH PPS REVENUE CREDIT

## 2018-04-14 PROCEDURE — 3331090002 HH PPS REVENUE DEBIT

## 2018-04-15 VITALS
TEMPERATURE: 98.3 F | RESPIRATION RATE: 16 BRPM | SYSTOLIC BLOOD PRESSURE: 128 MMHG | OXYGEN SATURATION: 98 % | HEART RATE: 82 BPM | DIASTOLIC BLOOD PRESSURE: 64 MMHG

## 2018-04-15 PROCEDURE — 3331090001 HH PPS REVENUE CREDIT

## 2018-04-15 PROCEDURE — 3331090002 HH PPS REVENUE DEBIT

## 2018-04-16 ENCOUNTER — HOME CARE VISIT (OUTPATIENT)
Dept: HOME HEALTH SERVICES | Facility: HOME HEALTH | Age: 83
End: 2018-04-16
Payer: MEDICARE

## 2018-04-16 PROCEDURE — 3331090002 HH PPS REVENUE DEBIT

## 2018-04-16 PROCEDURE — G0151 HHCP-SERV OF PT,EA 15 MIN: HCPCS

## 2018-04-16 PROCEDURE — 3331090001 HH PPS REVENUE CREDIT

## 2018-04-16 PROCEDURE — 3331090003 HH PPS REVENUE ADJ

## 2018-04-17 PROCEDURE — 3331090001 HH PPS REVENUE CREDIT

## 2018-04-17 PROCEDURE — 3331090002 HH PPS REVENUE DEBIT

## 2018-04-18 PROCEDURE — 3331090002 HH PPS REVENUE DEBIT

## 2018-04-18 PROCEDURE — 3331090001 HH PPS REVENUE CREDIT

## 2018-04-19 PROCEDURE — 3331090002 HH PPS REVENUE DEBIT

## 2018-04-19 PROCEDURE — 3331090001 HH PPS REVENUE CREDIT

## 2018-04-20 PROCEDURE — 3331090001 HH PPS REVENUE CREDIT

## 2018-04-20 PROCEDURE — 3331090002 HH PPS REVENUE DEBIT

## 2018-04-21 PROCEDURE — 3331090001 HH PPS REVENUE CREDIT

## 2018-04-21 PROCEDURE — 3331090002 HH PPS REVENUE DEBIT

## 2018-04-22 PROCEDURE — 3331090001 HH PPS REVENUE CREDIT

## 2018-04-22 PROCEDURE — 3331090002 HH PPS REVENUE DEBIT

## 2018-04-23 PROCEDURE — 3331090002 HH PPS REVENUE DEBIT

## 2018-04-23 PROCEDURE — 3331090001 HH PPS REVENUE CREDIT

## 2018-04-24 PROCEDURE — 3331090001 HH PPS REVENUE CREDIT

## 2018-04-24 PROCEDURE — 3331090002 HH PPS REVENUE DEBIT

## 2018-04-25 PROCEDURE — 3331090001 HH PPS REVENUE CREDIT

## 2018-04-25 PROCEDURE — 3331090002 HH PPS REVENUE DEBIT

## 2018-04-26 PROCEDURE — 3331090002 HH PPS REVENUE DEBIT

## 2018-04-26 PROCEDURE — 3331090001 HH PPS REVENUE CREDIT

## 2018-04-27 PROCEDURE — 3331090001 HH PPS REVENUE CREDIT

## 2018-04-27 PROCEDURE — 3331090002 HH PPS REVENUE DEBIT

## 2018-04-28 PROCEDURE — 3331090002 HH PPS REVENUE DEBIT

## 2018-04-28 PROCEDURE — 3331090001 HH PPS REVENUE CREDIT

## 2018-04-29 PROCEDURE — 3331090001 HH PPS REVENUE CREDIT

## 2018-04-29 PROCEDURE — 3331090002 HH PPS REVENUE DEBIT

## 2020-08-26 NOTE — ED NOTES
Attempted report.
Pt back from radiology.
Straight cath performed with Mary MOREIRA RN assisting. Pt tolerated well. Purewick placed.
TRANSFER - OUT REPORT:    Verbal report given to Lulu Alejandro RN(name) on Brown Tapia  being transferred to 5th floor(unit) for routine progression of care       Report consisted of patients Situation, Background, Assessment and   Recommendations(SBAR). Information from the following report(s) SBAR was reviewed with the receiving nurse. Lines:   Peripheral IV 03/30/18 Right Antecubital (Active)   Site Assessment Clean, dry, & intact 3/30/2018  9:26 AM   Phlebitis Assessment 0 3/30/2018  9:26 AM   Infiltration Assessment 0 3/30/2018  9:26 AM   Dressing Status Clean, dry, & intact 3/30/2018  9:26 AM   Dressing Type Transparent 3/30/2018  9:26 AM   Hub Color/Line Status Pink 3/30/2018  9:26 AM        Opportunity for questions and clarification was provided.       Patient transported with PCT
Negative

## 2020-09-02 DIAGNOSIS — C64.9 MALIGNANT NEOPLASM OF KIDNEY, UNSPECIFIED LATERALITY (HCC): ICD-10-CM

## 2020-09-21 ENCOUNTER — APPOINTMENT (OUTPATIENT)
Dept: CT IMAGING | Age: 85
DRG: 640 | End: 2020-09-21
Attending: EMERGENCY MEDICINE
Payer: MEDICARE

## 2020-09-21 ENCOUNTER — APPOINTMENT (OUTPATIENT)
Dept: VASCULAR SURGERY | Age: 85
DRG: 640 | End: 2020-09-21
Attending: EMERGENCY MEDICINE
Payer: MEDICARE

## 2020-09-21 ENCOUNTER — HOSPITAL ENCOUNTER (INPATIENT)
Age: 85
LOS: 1 days | Discharge: NURSING, REHAB OR LONG-TERM CARE | DRG: 640 | End: 2020-09-25
Attending: EMERGENCY MEDICINE | Admitting: FAMILY MEDICINE
Payer: MEDICARE

## 2020-09-21 ENCOUNTER — HOSPITAL ENCOUNTER (OUTPATIENT)
Dept: MRI IMAGING | Age: 85
Setting detail: OBSERVATION
Discharge: HOME | DRG: 640 | End: 2020-09-21
Attending: INTERNAL MEDICINE
Payer: MEDICARE

## 2020-09-21 ENCOUNTER — APPOINTMENT (OUTPATIENT)
Dept: GENERAL RADIOLOGY | Age: 85
DRG: 640 | End: 2020-09-21
Attending: EMERGENCY MEDICINE
Payer: MEDICARE

## 2020-09-21 DIAGNOSIS — I48.0 PAROXYSMAL A-FIB (HCC): ICD-10-CM

## 2020-09-21 DIAGNOSIS — R53.1 LEFT-SIDED WEAKNESS: Primary | ICD-10-CM

## 2020-09-21 PROBLEM — I95.9 HYPOTENSION: Status: ACTIVE | Noted: 2020-09-21

## 2020-09-21 PROBLEM — D64.9 ANEMIA: Status: ACTIVE | Noted: 2020-09-21

## 2020-09-21 PROBLEM — E86.0 DEHYDRATION: Status: ACTIVE | Noted: 2020-09-21

## 2020-09-21 PROBLEM — C64.9 RENAL CANCER (HCC): Status: ACTIVE | Noted: 2020-09-02

## 2020-09-21 PROBLEM — R33.9 URINARY RETENTION: Status: RESOLVED | Noted: 2018-03-31 | Resolved: 2020-09-21

## 2020-09-21 PROBLEM — C64.9 RENAL CANCER (HCC): Status: RESOLVED | Noted: 2020-09-02 | Resolved: 2020-09-21

## 2020-09-21 PROBLEM — R73.9 HYPERGLYCEMIA: Status: RESOLVED | Noted: 2018-03-30 | Resolved: 2020-09-21

## 2020-09-21 PROBLEM — M19.90 ACUTE ARTHRITIS: Status: RESOLVED | Noted: 2018-03-31 | Resolved: 2020-09-21

## 2020-09-21 PROBLEM — E87.6 HYPOKALEMIA: Status: ACTIVE | Noted: 2020-09-21

## 2020-09-21 PROBLEM — G93.41 ACUTE METABOLIC ENCEPHALOPATHY: Status: ACTIVE | Noted: 2020-09-21

## 2020-09-21 PROBLEM — R79.89 ELEVATED SERUM CREATININE: Status: RESOLVED | Noted: 2018-03-30 | Resolved: 2020-09-21

## 2020-09-21 LAB
ALBUMIN SERPL-MCNC: 3.4 G/DL (ref 3.5–5)
ALBUMIN/GLOB SERPL: 0.9 {RATIO} (ref 1.1–2.2)
ALP SERPL-CCNC: 69 U/L (ref 45–117)
ALT SERPL-CCNC: 32 U/L (ref 12–78)
AMPHET UR QL SCN: NEGATIVE
ANION GAP SERPL CALC-SCNC: 4 MMOL/L (ref 5–15)
APPEARANCE UR: CLEAR
AST SERPL-CCNC: 61 U/L (ref 15–37)
BACTERIA URNS QL MICRO: NEGATIVE /HPF
BARBITURATES UR QL SCN: NEGATIVE
BASOPHILS # BLD: 0 K/UL (ref 0–0.1)
BASOPHILS NFR BLD: 0 % (ref 0–1)
BENZODIAZ UR QL: NEGATIVE
BILIRUB SERPL-MCNC: 1 MG/DL (ref 0.2–1)
BILIRUB UR QL: NEGATIVE
BUN SERPL-MCNC: 38 MG/DL (ref 6–20)
BUN/CREAT SERPL: 42 (ref 12–20)
CALCIUM SERPL-MCNC: 11 MG/DL (ref 8.5–10.1)
CANNABINOIDS UR QL SCN: NEGATIVE
CHLORIDE SERPL-SCNC: 102 MMOL/L (ref 97–108)
CO2 SERPL-SCNC: 30 MMOL/L (ref 21–32)
COCAINE UR QL SCN: NEGATIVE
COLOR UR: ABNORMAL
COMMENT, HOLDF: NORMAL
CREAT SERPL-MCNC: 0.9 MG/DL (ref 0.55–1.02)
DIFFERENTIAL METHOD BLD: ABNORMAL
DRUG SCRN COMMENT,DRGCM: NORMAL
EOSINOPHIL # BLD: 0.1 K/UL (ref 0–0.4)
EOSINOPHIL NFR BLD: 1 % (ref 0–7)
EPITH CASTS URNS QL MICRO: ABNORMAL /LPF
ERYTHROCYTE [DISTWIDTH] IN BLOOD BY AUTOMATED COUNT: 13.2 % (ref 11.5–14.5)
FERRITIN SERPL-MCNC: 93 NG/ML (ref 8–252)
FOLATE SERPL-MCNC: 18.6 NG/ML (ref 5–21)
GLOBULIN SER CALC-MCNC: 3.7 G/DL (ref 2–4)
GLUCOSE SERPL-MCNC: 97 MG/DL (ref 65–100)
GLUCOSE UR STRIP.AUTO-MCNC: NEGATIVE MG/DL
HCT VFR BLD AUTO: 34.4 % (ref 35–47)
HGB BLD-MCNC: 11.1 G/DL (ref 11.5–16)
HGB UR QL STRIP: NEGATIVE
HYALINE CASTS URNS QL MICRO: ABNORMAL /LPF (ref 0–5)
IMM GRANULOCYTES # BLD AUTO: 0 K/UL (ref 0–0.04)
IMM GRANULOCYTES NFR BLD AUTO: 0 % (ref 0–0.5)
INR PPP: 1 (ref 0.9–1.1)
KETONES UR QL STRIP.AUTO: ABNORMAL MG/DL
LDH SERPL L TO P-CCNC: 245 U/L (ref 81–246)
LEUKOCYTE ESTERASE UR QL STRIP.AUTO: ABNORMAL
LYMPHOCYTES # BLD: 0.8 K/UL (ref 0.8–3.5)
LYMPHOCYTES NFR BLD: 11 % (ref 12–49)
MCH RBC QN AUTO: 30.7 PG (ref 26–34)
MCHC RBC AUTO-ENTMCNC: 32.3 G/DL (ref 30–36.5)
MCV RBC AUTO: 95 FL (ref 80–99)
METHADONE UR QL: NEGATIVE
MONOCYTES # BLD: 0.7 K/UL (ref 0–1)
MONOCYTES NFR BLD: 10 % (ref 5–13)
NEUTS SEG # BLD: 5.3 K/UL (ref 1.8–8)
NEUTS SEG NFR BLD: 78 % (ref 32–75)
NITRITE UR QL STRIP.AUTO: NEGATIVE
NRBC # BLD: 0 K/UL (ref 0–0.01)
NRBC BLD-RTO: 0 PER 100 WBC
OPIATES UR QL: NEGATIVE
PCP UR QL: NEGATIVE
PH UR STRIP: 7 [PH] (ref 5–8)
PLATELET # BLD AUTO: 218 K/UL (ref 150–400)
PMV BLD AUTO: 9.4 FL (ref 8.9–12.9)
POTASSIUM SERPL-SCNC: 3.4 MMOL/L (ref 3.5–5.1)
PROT SERPL-MCNC: 7.1 G/DL (ref 6.4–8.2)
PROT UR STRIP-MCNC: NEGATIVE MG/DL
PROTHROMBIN TIME: 10.4 SEC (ref 9–11.1)
RBC # BLD AUTO: 3.62 M/UL (ref 3.8–5.2)
RBC #/AREA URNS HPF: ABNORMAL /HPF (ref 0–5)
RBC MORPH BLD: ABNORMAL
RETICS # AUTO: 0.04 M/UL (ref 0.02–0.08)
RETICS/RBC NFR AUTO: 1.2 % (ref 0.7–2.1)
SAMPLES BEING HELD,HOLD: NORMAL
SODIUM SERPL-SCNC: 136 MMOL/L (ref 136–145)
SP GR UR REFRACTOMETRY: 1.02 (ref 1–1.03)
TSH SERPL DL<=0.05 MIU/L-ACNC: 1.43 UIU/ML (ref 0.36–3.74)
UR CULT HOLD, URHOLD: NORMAL
UROBILINOGEN UR QL STRIP.AUTO: 1 EU/DL (ref 0.2–1)
VIT B12 SERPL-MCNC: 562 PG/ML (ref 193–986)
WBC # BLD AUTO: 6.9 K/UL (ref 3.6–11)
WBC URNS QL MICRO: ABNORMAL /HPF (ref 0–4)

## 2020-09-21 PROCEDURE — 99218 HC RM OBSERVATION: CPT

## 2020-09-21 PROCEDURE — 85025 COMPLETE CBC W/AUTO DIFF WBC: CPT

## 2020-09-21 PROCEDURE — 82728 ASSAY OF FERRITIN: CPT

## 2020-09-21 PROCEDURE — 85045 AUTOMATED RETICULOCYTE COUNT: CPT

## 2020-09-21 PROCEDURE — 74011000636 HC RX REV CODE- 636

## 2020-09-21 PROCEDURE — 83615 LACTATE (LD) (LDH) ENZYME: CPT

## 2020-09-21 PROCEDURE — 80053 COMPREHEN METABOLIC PANEL: CPT

## 2020-09-21 PROCEDURE — 80307 DRUG TEST PRSMV CHEM ANLYZR: CPT

## 2020-09-21 PROCEDURE — 82607 VITAMIN B-12: CPT

## 2020-09-21 PROCEDURE — 77030038269 HC DRN EXT URIN PURWCK BARD -A

## 2020-09-21 PROCEDURE — 85610 PROTHROMBIN TIME: CPT

## 2020-09-21 PROCEDURE — 74011250636 HC RX REV CODE- 250/636: Performed by: INTERNAL MEDICINE

## 2020-09-21 PROCEDURE — 87635 SARS-COV-2 COVID-19 AMP PRB: CPT

## 2020-09-21 PROCEDURE — 83540 ASSAY OF IRON: CPT

## 2020-09-21 PROCEDURE — 93971 EXTREMITY STUDY: CPT

## 2020-09-21 PROCEDURE — 70450 CT HEAD/BRAIN W/O DYE: CPT

## 2020-09-21 PROCEDURE — 93005 ELECTROCARDIOGRAM TRACING: CPT

## 2020-09-21 PROCEDURE — 84443 ASSAY THYROID STIM HORMONE: CPT

## 2020-09-21 PROCEDURE — 73080 X-RAY EXAM OF ELBOW: CPT

## 2020-09-21 PROCEDURE — 81001 URINALYSIS AUTO W/SCOPE: CPT

## 2020-09-21 PROCEDURE — 73030 X-RAY EXAM OF SHOULDER: CPT

## 2020-09-21 PROCEDURE — 70496 CT ANGIOGRAPHY HEAD: CPT

## 2020-09-21 PROCEDURE — 83010 ASSAY OF HAPTOGLOBIN QUANT: CPT

## 2020-09-21 PROCEDURE — 74011250637 HC RX REV CODE- 250/637: Performed by: INTERNAL MEDICINE

## 2020-09-21 PROCEDURE — 70551 MRI BRAIN STEM W/O DYE: CPT

## 2020-09-21 PROCEDURE — 0042T CT PERF W CBF: CPT

## 2020-09-21 PROCEDURE — 99285 EMERGENCY DEPT VISIT HI MDM: CPT

## 2020-09-21 PROCEDURE — 71045 X-RAY EXAM CHEST 1 VIEW: CPT

## 2020-09-21 PROCEDURE — 82746 ASSAY OF FOLIC ACID SERUM: CPT

## 2020-09-21 PROCEDURE — 36415 COLL VENOUS BLD VENIPUNCTURE: CPT

## 2020-09-21 PROCEDURE — 74011000636 HC RX REV CODE- 636: Performed by: RADIOLOGY

## 2020-09-21 RX ORDER — HYDROCHLOROTHIAZIDE 25 MG/1
25 TABLET ORAL DAILY
COMMUNITY
End: 2020-09-25

## 2020-09-21 RX ORDER — FAMOTIDINE 20 MG/1
20 TABLET, FILM COATED ORAL 2 TIMES DAILY
Status: DISCONTINUED | OUTPATIENT
Start: 2020-09-21 | End: 2020-09-23

## 2020-09-21 RX ORDER — POTASSIUM CHLORIDE 750 MG/1
40 TABLET, FILM COATED, EXTENDED RELEASE ORAL EVERY 4 HOURS
Status: COMPLETED | OUTPATIENT
Start: 2020-09-21 | End: 2020-09-22

## 2020-09-21 RX ORDER — ENOXAPARIN SODIUM 100 MG/ML
40 INJECTION SUBCUTANEOUS DAILY
Status: DISCONTINUED | OUTPATIENT
Start: 2020-09-22 | End: 2020-09-25 | Stop reason: HOSPADM

## 2020-09-21 RX ORDER — DOCUSATE SODIUM 100 MG/1
100 CAPSULE, LIQUID FILLED ORAL DAILY
COMMUNITY

## 2020-09-21 RX ORDER — DEXTROSE, SODIUM CHLORIDE, AND POTASSIUM CHLORIDE 5; .45; .15 G/100ML; G/100ML; G/100ML
125 INJECTION INTRAVENOUS CONTINUOUS
Status: DISCONTINUED | OUTPATIENT
Start: 2020-09-21 | End: 2020-09-23

## 2020-09-21 RX ORDER — ONDANSETRON 2 MG/ML
4 INJECTION INTRAMUSCULAR; INTRAVENOUS
Status: DISCONTINUED | OUTPATIENT
Start: 2020-09-21 | End: 2020-09-25 | Stop reason: HOSPADM

## 2020-09-21 RX ORDER — OLMESARTAN MEDOXOMIL 40 MG/1
40 TABLET ORAL DAILY
COMMUNITY

## 2020-09-21 RX ORDER — ACETAMINOPHEN 325 MG/1
650 TABLET ORAL
Status: DISCONTINUED | OUTPATIENT
Start: 2020-09-21 | End: 2020-09-25 | Stop reason: HOSPADM

## 2020-09-21 RX ORDER — ONDANSETRON 4 MG/1
4 TABLET, ORALLY DISINTEGRATING ORAL
Status: DISCONTINUED | OUTPATIENT
Start: 2020-09-21 | End: 2020-09-25 | Stop reason: HOSPADM

## 2020-09-21 RX ORDER — GUAIFENESIN 100 MG/5ML
81 LIQUID (ML) ORAL DAILY
COMMUNITY

## 2020-09-21 RX ORDER — POLYETHYLENE GLYCOL 3350 17 G/17G
17 POWDER, FOR SOLUTION ORAL DAILY PRN
Status: DISCONTINUED | OUTPATIENT
Start: 2020-09-21 | End: 2020-09-25 | Stop reason: HOSPADM

## 2020-09-21 RX ORDER — TRAMADOL HYDROCHLORIDE 50 MG/1
50 TABLET ORAL 2 TIMES DAILY
COMMUNITY
End: 2020-09-25

## 2020-09-21 RX ORDER — ACETAMINOPHEN 650 MG/1
650 SUPPOSITORY RECTAL
Status: DISCONTINUED | OUTPATIENT
Start: 2020-09-21 | End: 2020-09-25 | Stop reason: HOSPADM

## 2020-09-21 RX ORDER — GUAIFENESIN 100 MG/5ML
81 LIQUID (ML) ORAL DAILY
Status: DISCONTINUED | OUTPATIENT
Start: 2020-09-22 | End: 2020-09-25 | Stop reason: HOSPADM

## 2020-09-21 RX ORDER — ATORVASTATIN CALCIUM 20 MG/1
20 TABLET, FILM COATED ORAL
Status: DISCONTINUED | OUTPATIENT
Start: 2020-09-21 | End: 2020-09-25 | Stop reason: HOSPADM

## 2020-09-21 RX ADMIN — POTASSIUM CHLORIDE, DEXTROSE MONOHYDRATE AND SODIUM CHLORIDE 125 ML/HR: 150; 5; 450 INJECTION, SOLUTION INTRAVENOUS at 21:39

## 2020-09-21 RX ADMIN — IOPAMIDOL 50 ML: 755 INJECTION, SOLUTION INTRAVENOUS at 14:28

## 2020-09-21 RX ADMIN — ATORVASTATIN CALCIUM 20 MG: 20 TABLET, FILM COATED ORAL at 21:38

## 2020-09-21 RX ADMIN — IOPAMIDOL 100 ML: 755 INJECTION, SOLUTION INTRAVENOUS at 14:27

## 2020-09-21 RX ADMIN — POTASSIUM CHLORIDE 40 MEQ: 750 TABLET, FILM COATED, EXTENDED RELEASE ORAL at 18:06

## 2020-09-21 RX ADMIN — POTASSIUM CHLORIDE 40 MEQ: 750 TABLET, FILM COATED, EXTENDED RELEASE ORAL at 21:38

## 2020-09-21 RX ADMIN — SODIUM CHLORIDE 1000 ML: 900 INJECTION, SOLUTION INTRAVENOUS at 18:12

## 2020-09-21 NOTE — ACP (ADVANCE CARE PLANNING)
[address] [facility]  Hospitalist Group                                      Advance Care Planning Note    Name: Romana Goldsmith  YOB: 1931  MRN: [id number]  Admission Date: 9/21/2020 11:16 AM    Date of discussion: 9/21/2020    Active Diagnoses:    Hospital Problems  Date Reviewed: 9/21/2020           Anemia   Hypokalemia   Weakness   Acute metabolic encephalopathy   Hypotension   Dehydration   Renal cancer (HCC)   HTN (hypertension) (Chronic)   High cholesterol (Chronic)          These active diagnoses are of sufficient risk that focused discussion on advance care planning is indicated in order to allow the patient to thoughtfully consider personal goals of care, and if situations arise that prevent the ability to personally give input, to ensure appropriate representation of their personal desires for different levels and aggressiveness of care. Discussion:     Persons present and participating in discussion: Romana JenniferRc MD, Dr Gloria Guillen son who is MPOA    Discussion: Should pain and dehydration, treatment and prognosis, in setting of HTN. She has AD documents. She confirms with son she is DNR. Time Spent:     Total time spent face-to-face in education and discussion: 16 minutes.      Rc Gonzales MD  Date of Service:  9/21/2020  6:04 PM

## 2020-09-21 NOTE — ED NOTES
Son in the room reports that patient has been confused this morning, leaning to the left side and weakness in the left side that are knew for the patient. Will reports to the patient.

## 2020-09-21 NOTE — ED TRIAGE NOTES
Patient arrived to ED form Gustavo Alvarez. Reports using bathroom this morning and stopped herself from falling by holding on to the rail with her left arm. Did not call for help while trying to lift herself. Now states that her left elbow is hurting and bruising noted to the left elbow and left hand. Patient has Hx of hyperlipidemia,HT and ovarian cancer. Patient is A&O x 4 on arrival, VS are stable per EMS.

## 2020-09-21 NOTE — PROGRESS NOTES
[facility]: MED RECONCILIATION      Medications added:   ASA  Olmesartan   HCTZ  Docusate    Medications removed:  Senna-docusate  Percocet 5-325mg q 4 hrs prn  Anastrozole 1mg daily    Information obtained from: Medication bottles, family member, patient, RxQuery      Allergies: Aleve [naproxen sodium] and Codeine    Prior to Admission Medications:     Medication Documentation Review Audit       Reviewed by Supriya Lee PHARMD (Pharmacist) on 09/21/20 at 1714      Medication Sig Documenting Provider Last Dose Status Taking?   aspirin 81 mg chewable tablet Take 81 mg by mouth daily. Provider, Historical 9/21/2020 AM Active Yes   cholecalciferol, vitamin D3, (VITAMIN D3) 2,000 unit tab Take 1 Tab by mouth daily. Provider, Historical 9/21/2020 AM Active Yes   docusate sodium (COLACE) 100 mg capsule Take 100 mg by mouth daily. Provider, Historical 9/21/2020 AM Active Yes   hydroCHLOROthiazide (HYDRODIURIL) 25 mg tablet Take 25 mg by mouth daily. Provider, Historical 9/21/2020 AM Active Yes   LECITHIN PO Take 1,200 mg by mouth daily. Evelia Ramirez MD 9/21/2020 AM Active Yes   olmesartan (BENICAR) 40 mg tablet Take 40 mg by mouth daily. Provider, Historical 9/21/2020 AM Active Yes   simvastatin (ZOCOR) 40 mg tablet Take 40 mg by mouth nightly. Evelia Ramirez MD 9/20/2020 PM Active Yes   traMADoL (ULTRAM) 50 mg tablet Take 50 mg by mouth two (2) times a day. Provider, Historical 9/21/2020 AM Active Yes                    Bianca Benson

## 2020-09-21 NOTE — PROGRESS NOTES
Bedside and Verbal shift change report given to Simeon Gardner (oncoming nurse) by Sammi Mallory (offgoing nurse). Report included the following information SBAR, Kardex and MAR.

## 2020-09-21 NOTE — H&P
[facility]    Hospitalist Admission Note      NAME:  Zachery Guzman   :   1931   MRN:  [id number]     PCP:  Vimal Plascencia MD     Date/Time of service:  2020 5:38 PM          Subjective:     CHIEF COMPLAINT: weak    HISTORY OF PRESENT ILLNESS:     Ms. Pillo Cornell is a 80 y.o.  female who presented to the Emergency Department complaining of weakness. Noted to worsen over a week, with perhaps left weakness greater than right. Per patient this is due to severe R shoulder pain. Also more confused, but better in ER now. She reports not eating and drinking well due to pain, and taking extra Ultram. ER finds dehydration and normal head imaging. 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 She is s/p a right robotic partial nephrectomy 3/20/17 for a t1a clear cell renal carcinoma        Past Surgical History:   Procedure Laterality Date    HX HYSTERECTOMY      HX KNEE REPLACEMENT      right     HX NEPHRECTOMY Right 2017    PARTIAL     HX NEPHRECTOMY Left 2018    PARTIAL     HX SHOULDER REPLACEMENT      right        Social History     Tobacco Use    Smoking status: Never Smoker    Smokeless tobacco: Never Used   Substance Use Topics    Alcohol use: Not on file        Family History   Problem Relation Age of Onset    Hypertension Other       Family hx cannot be fully assessed, since the patient cannot provide information    Allergies   Allergen Reactions    Aleve [Naproxen Sodium] Hives    Codeine Nausea and Vomiting        Prior to Admission medications    Medication Sig Start Date End Date Taking? Authorizing Provider   olmesartan (BENICAR) 40 mg tablet Take 40 mg by mouth daily.    Yes Provider, Historical   hydroCHLOROthiazide (HYDRODIURIL) 25 mg tablet Take 25 mg by mouth daily. Yes Provider, Historical   docusate sodium (COLACE) 100 mg capsule Take 100 mg by mouth daily. Yes Provider, Historical   aspirin 81 mg chewable tablet Take 81 mg by mouth daily. Yes Provider, Historical   traMADoL (ULTRAM) 50 mg tablet Take 50 mg by mouth two (2) times a day. Yes Provider, Historical   cholecalciferol, vitamin D3, (VITAMIN D3) 2,000 unit tab Take 1 Tab by mouth daily. Yes Provider, Historical   simvastatin (ZOCOR) 40 mg tablet Take 40 mg by mouth nightly. Yes Other, MD Evelia   LECITHIN PO Take 1,200 mg by mouth daily.    Yes Other, MD Evelia       Review of Systems:  (bold if positive, if negative)    Gen:  Eyes:  ENT:  CVS:  Pulm:  GI:  :  MS:  Pain, weakness, swelling, arthritisSkin:  Psych:  Endo:  Hem:  Renal:  Neuro:  weakness      Objective:      VITALS:    Vital signs reviewed; most recent are:    Visit Vitals  /63   Pulse 62   Temp 97.9 °F (36.6 °C)   Resp 15   Wt 60.3 kg (133 lb)   SpO2 99%   BMI 25.13 kg/m²     SpO2 Readings from Last 6 Encounters:   09/21/20 99%   04/12/18 98%   04/09/18 98%   04/01/18 96%   12/06/16 97%        No intake or output data in the 24 hours ending 09/21/20 9000     Exam:     Physical Exam:    Gen:  Frail, in no acute distress  HEENT:  Pink conjunctivae, PERRL, hearing intact to voice, dry mucous membranes  Neck:  Supple, without masses, thyroid non-tender  Resp:  No accessory muscle use, clear breath sounds without wheezes rales or rhonchi  Card:  No murmurs, normal S1, S2 without thrills, bruits or peripheral edema  Abd:  Soft, non-tender, non-distended, normoactive bowel sounds are present, no mass  Lymph:  No cervical or inguinal adenopathy  Musc:  No cyanosis or clubbing  Skin:  No rashes or ulcers, skin turgor is reduced  Neuro:  Cranial nerves are grossly intact, general motor weakness, follows commands vaguely  Psych:  Poor insight, oriented to person, place     Labs:    Recent Labs 09/21/20  1259   WBC 6.9   HGB 11.1*   HCT 34.4*        Recent Labs     09/21/20  1259      K 3.4*      CO2 30   GLU 97   BUN 38*   CREA 0.90   CA 11.0*   ALB 3.4*   TBILI 1.0   ALT 32     Lab Results   Component Value Date/Time    Glucose (POC) 109 (H) 01/24/2011 06:39 AM     No results for input(s): PH, PCO2, PO2, HCO3, FIO2 in the last 72 hours. Recent Labs     09/21/20  1259   INR 1.0     All Micro Results     Procedure Component Value Units Date/Time    URINE CULTURE HOLD SAMPLE [[id number]] Collected:  09/21/20 1711    Order Status:  Completed Specimen:  Urine from Serum Updated:  09/21/20 1719     Urine culture hold       Urine on hold in Microbiology dept for 2 days. If unpreserved urine is submitted, it cannot be used for addtional testing after 24 hours, recollection will be required. I have reviewed previous records       Assessment and Plan:      Weakness - POA, likely due to dehydration, but check TIA workup. Not a TPA candidate. Admit to remote tele. Neuro checks and neurology consult. Fall precautions and PT/OT/speech evaluation. Monitor BP and check A1c and lipid panel. Check MRI brain, ECHO. Continue ASA and statin    Acute metabolic encephalopathy - POA, mild. Likely due to dehydration in setting of early dementia, ultram may contribute. Hydrate. Check drug screen. Outpatient neuropsych testing. Hold ultram    Dehydration / Hypokalemia - POA due to poor PO intake. Hydrate and replete K. Hold HCTZ      Hypotension / HTN (hypertension) - POA, stop olmesartan and HCTZ. Allow permissive HTN in 81 yo woman. Anemia - POA, mild, but will worsen with hydration. Check serologies. High cholesterol - Check panel. Continue simvastatin. No hx of CAD nor CVA to demand tight control in this 81 yo woman. Hx Renal cancer - Outpatient follow up.     Telemetry reviewed:   normal sinus rhythm    Risk of deterioration: high      Total time spent with patient: 79 Minutes I personally reviewed chart, notes, data and current medications in the medical record. I have personally examined and treated the patient at bedside during this period.                  Care Plan discussed with: Patient, Nursing Staff and >50% of time spent in counseling and coordination of care    Discussed:  Care Plan       ___________________________________________________    Attending Physician: Yobani Patel MD

## 2020-09-21 NOTE — ED PROVIDER NOTES
69-year-old female presents from nursing home with chief complaint of left elbow and shoulder pain. There is no history of fall. She tells me that she was from use the restroom and slipped, and is holding herself up on her left elbow, trying to right herself. She was in this position for some time before help arrived. EMS was then called in as the patient is complaining of left shoulder and elbow pain she was brought to the emergency department for further management. Patient is currently taking tramadol for chronic left shoulder pain. 1245: Patient son arrived at bedside to provide additional history. Patient was last known well 1 week ago since that time is had progressive weakness with difficulty ambulating and getting out of bed. This progressed until this morning when her home health aide was unable to get out of bed to go to the bathroom. Patient has no complaints of weakness but per the son she has been leaning and falling to the left since this morning. He called on the phone this morning to check on her and discovered that she was not acting like herself, having forgotten to take several of her medications. He is a pediatrician is concerned she may have suffered a stroke sometime in the last week. The history is provided by the patient. Elbow Pain This is a new problem. The current episode started less than 1 hour ago. The pain is present in the left elbow. The pain is moderate. Associated symptoms include limited range of motion. Pertinent negatives include no numbness, no stiffness, no tingling, no itching, no back pain and no neck pain. She has tried nothing for the symptoms. There has been no history of extremity trauma. Past Medical History:  
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 She is s/p a right robotic partial nephrectomy 3/20/17 for a t1a clear cell renal carcinoma Past Surgical History:  
Procedure Laterality Date  HX HYSTERECTOMY  HX KNEE REPLACEMENT    
 right 2010  HX NEPHRECTOMY Right 03/20/2017 PARTIAL   
 HX NEPHRECTOMY Left 03/26/2018 PARTIAL   
 HX SHOULDER REPLACEMENT    
 right 2011 Family History:  
Problem Relation Age of Onset  Hypertension Other Social History Socioeconomic History  Marital status:  Spouse name: Not on file  Number of children: Not on file  Years of education: Not on file  Highest education level: Not on file Occupational History  Not on file Social Needs  Financial resource strain: Not on file  Food insecurity Worry: Not on file Inability: Not on file  Transportation needs Medical: Not on file Non-medical: Not on file Tobacco Use  Smoking status: Never Smoker  Smokeless tobacco: Never Used Substance and Sexual Activity  Alcohol use: Not on file  Drug use: Not on file  Sexual activity: Not on file Lifestyle  Physical activity Days per week: Not on file Minutes per session: Not on file  Stress: Not on file Relationships  Social connections Talks on phone: Not on file Gets together: Not on file Attends Scientologist service: Not on file Active member of club or organization: Not on file Attends meetings of clubs or organizations: Not on file Relationship status: Not on file  Intimate partner violence Fear of current or ex partner: Not on file Emotionally abused: Not on file Physically abused: Not on file Forced sexual activity: Not on file Other Topics Concern  Not on file Social History Narrative  Not on file ALLERGIES: Aleve [naproxen sodium] and Codeine Review of Systems Constitutional: Negative for fatigue and fever. HENT: Negative for sneezing and sore throat. Respiratory: Negative for cough and shortness of breath. Cardiovascular: Negative for chest pain and leg swelling. Gastrointestinal: Negative for abdominal pain, diarrhea, nausea and vomiting. Genitourinary: Negative for difficulty urinating and dysuria. Musculoskeletal: Positive for arthralgias. Negative for back pain, myalgias, neck pain and stiffness. Skin: Negative for color change, itching and rash. Neurological: Negative for tingling, weakness, numbness and headaches. Psychiatric/Behavioral: Negative for agitation and behavioral problems. Vitals:  
 09/21/20 1121 BP: 128/63 Pulse: 69 Resp: 16 Temp: 97.9 °F (36.6 °C) SpO2: 100% Weight: 60.3 kg (133 lb) Physical Exam 
Vitals signs and nursing note reviewed. Constitutional:   
   General: She is not in acute distress. Appearance: Normal appearance. HENT:  
   Head: Normocephalic and atraumatic. Nose: Nose normal.  
   Mouth/Throat:  
   Mouth: Mucous membranes are moist.  
   Pharynx: Oropharynx is clear. Eyes:  
   Extraocular Movements: Extraocular movements intact. Pupils: Pupils are equal, round, and reactive to light. Neck: Musculoskeletal: Normal range of motion and neck supple. Cardiovascular:  
   Rate and Rhythm: Normal rate and regular rhythm. Pulses: Normal pulses. Heart sounds: Normal heart sounds. Pulmonary:  
   Effort: Pulmonary effort is normal.  
   Breath sounds: Normal breath sounds. Abdominal:  
   Palpations: Abdomen is soft. Tenderness: There is no abdominal tenderness. Musculoskeletal:  
     Arms: 
 
   Right lower leg: Edema present. Left lower leg: Edema present. Skin: 
   General: Skin is warm and dry. Capillary Refill: Capillary refill takes less than 2 seconds. Neurological:  
   General: No focal deficit present. Mental Status: She is alert and oriented to person, place, and time. GCS: GCS eye subscore is 4. GCS verbal subscore is 5. GCS motor subscore is 6. Cranial Nerves: Cranial nerves are intact. Motor: Motor function is intact. Coordination: Coordination abnormal (Difficulty with rapid alternating movements, with left hand being slower). Finger-Nose-Finger Test abnormal (Due to pain at the right shoulder). Psychiatric:     
   Mood and Affect: Mood normal.     
   Behavior: Behavior normal.  
 
  
 
MDM Procedures Rhythm: Sinus bradycardia at a rate of approximately 57. Axis: normal.  ST segment:  No concerning ST elevations or depressions. This EKG was interpreted by Radha Marino MD,ED Provider. 1510: Nurse reports rhythm change from normal sinus to A. fib. EKG at this time shows rhythm: Atrial fibrillation with ventricular sponsor approximately 74 bpm.  Axis: normal.  ST segment:  No concerning ST elevations or depressions. This EKG was interpreted by Radha Marino MD,ED Provider. 1515: Patient turned over to Dr. Roxanne Lowry pending the remainder of her workup and disposition.

## 2020-09-21 NOTE — ED NOTES
TRANSFER - OUT REPORT:    Verbal report given to JACKIE Farrell 5th floor(name) on Zachery Guzman  being transferred to 5th floor(unit) for routine progression of care       Report consisted of patients Situation, Background, Assessment and   Recommendations(SBAR). Information from the following report(s) SBAR, ED Summary, Procedure Summary, Intake/Output, MAR, Recent Results and Cardiac Rhythm NSR was reviewed with the receiving nurse. Lines:   Peripheral IV 09/21/20 Left Antecubital (Active)        Opportunity for questions and clarification was provided.       Patient transported with:   Monitor  Registered Nurse

## 2020-09-21 NOTE — ED PROVIDER NOTES
42-year-old female presents from nursing home with chief complaint of left elbow and shoulder pain. There is no history of fall. She tells me that she was from use the restroom and slipped, and is holding herself up on her left elbow, trying to right herself. She was in this position for some time before help arrived. EMS was then called in as the patient is complaining of left shoulder and elbow pain she was brought to the emergency department for further management. Patient is currently taking tramadol for chronic left shoulder pain. 1245: Patient son arrived at bedside to provide additional history. Patient was last known well 1 week ago since that time is had progressive weakness with difficulty ambulating and getting out of bed. This progressed until this morning when her home health aide was unable to get out of bed to go to the bathroom. Patient has no complaints of weakness but per the son she has been leaning and falling to the left since this morning. He called on the phone this morning to check on her and discovered that she was not acting like herself, having forgotten to take several of her medications. He is a pediatrician is concerned she may have suffered a stroke sometime in the last week. The history is provided by the patient. Elbow Pain This is a new problem. The current episode started less than 1 hour ago. The pain is present in the left elbow. The pain is moderate. Associated symptoms include limited range of motion. Pertinent negatives include no numbness, no stiffness, no tingling, no itching, no back pain and no neck pain. She has tried nothing for the symptoms. There has been no history of extremity trauma. Past Medical History:  
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 She is s/p a right robotic partial nephrectomy 3/20/17 for a t1a clear cell renal carcinoma Past Surgical History:  
Procedure Laterality Date  HX HYSTERECTOMY  HX KNEE REPLACEMENT    
 right 2010  HX NEPHRECTOMY Right 03/20/2017 PARTIAL   
 HX NEPHRECTOMY Left 03/26/2018 PARTIAL   
 HX SHOULDER REPLACEMENT    
 right 2011 Family History:  
Problem Relation Age of Onset  Hypertension Other Social History Socioeconomic History  Marital status:  Spouse name: Not on file  Number of children: Not on file  Years of education: Not on file  Highest education level: Not on file Occupational History  Not on file Social Needs  Financial resource strain: Not on file  Food insecurity Worry: Not on file Inability: Not on file  Transportation needs Medical: Not on file Non-medical: Not on file Tobacco Use  Smoking status: Never Smoker  Smokeless tobacco: Never Used Substance and Sexual Activity  Alcohol use: Not on file  Drug use: Not on file  Sexual activity: Not on file Lifestyle  Physical activity Days per week: Not on file Minutes per session: Not on file  Stress: Not on file Relationships  Social connections Talks on phone: Not on file Gets together: Not on file Attends Orthodoxy service: Not on file Active member of club or organization: Not on file Attends meetings of clubs or organizations: Not on file Relationship status: Not on file  Intimate partner violence Fear of current or ex partner: Not on file Emotionally abused: Not on file Physically abused: Not on file Forced sexual activity: Not on file Other Topics Concern  Not on file Social History Narrative  Not on file ALLERGIES: Aleve [naproxen sodium] and Codeine Review of Systems Constitutional: Negative for fatigue and fever. HENT: Negative for sneezing and sore throat. Respiratory: Negative for cough and shortness of breath. Cardiovascular: Negative for chest pain and leg swelling. Gastrointestinal: Negative for abdominal pain, diarrhea, nausea and vomiting. Genitourinary: Negative for difficulty urinating and dysuria. Musculoskeletal: Positive for arthralgias. Negative for back pain, myalgias, neck pain and stiffness. Skin: Negative for color change, itching and rash. Neurological: Negative for tingling, weakness, numbness and headaches. Psychiatric/Behavioral: Negative for agitation and behavioral problems. Vitals:  
 09/21/20 1121 BP: 128/63 Pulse: 69 Resp: 16 Temp: 97.9 °F (36.6 °C) SpO2: 100% Weight: 60.3 kg (133 lb) Physical Exam 
Vitals signs and nursing note reviewed. Constitutional:   
   General: She is not in acute distress. Appearance: Normal appearance. HENT:  
   Head: Normocephalic and atraumatic. Nose: Nose normal.  
   Mouth/Throat:  
   Mouth: Mucous membranes are moist.  
   Pharynx: Oropharynx is clear. Eyes:  
   Extraocular Movements: Extraocular movements intact. Pupils: Pupils are equal, round, and reactive to light. Neck: Musculoskeletal: Normal range of motion and neck supple. Cardiovascular:  
   Rate and Rhythm: Normal rate and regular rhythm. Pulses: Normal pulses. Heart sounds: Normal heart sounds. Pulmonary:  
   Effort: Pulmonary effort is normal.  
   Breath sounds: Normal breath sounds. Abdominal:  
   Palpations: Abdomen is soft. Tenderness: There is no abdominal tenderness. Musculoskeletal:  
     Arms: 
 
   Right lower leg: Edema present. Left lower leg: Edema present. Skin: 
   General: Skin is warm and dry. Capillary Refill: Capillary refill takes less than 2 seconds. Neurological:  
   General: No focal deficit present. Mental Status: She is alert and oriented to person, place, and time. GCS: GCS eye subscore is 4. GCS verbal subscore is 5. GCS motor subscore is 6. Cranial Nerves: Cranial nerves are intact. Motor: Motor function is intact. Coordination: Coordination abnormal (Difficulty with rapid alternating movements, with left hand being slower). Finger-Nose-Finger Test abnormal (Due to pain at the right shoulder). Psychiatric:     
   Mood and Affect: Mood normal.     
   Behavior: Behavior normal.  
 
  
 
MDM Procedures Rhythm: Sinus bradycardia at a rate of approximately 57. Axis: normal.  ST segment:  No concerning ST elevations or depressions. This EKG was interpreted by Damaris Swenson MD,ED Provider. 1510: Nurse reports rhythm change from normal sinus to A. fib. EKG at this time shows rhythm: Atrial fibrillation with ventricular sponsor approximately 74 bpm.  Axis: normal.  ST segment:  No concerning ST elevations or depressions. This EKG was interpreted by Damaris Swenson MD,ED Provider. 1515: Patient turned over to Dr. Lesa Barlow pending the remainder of her workup and disposition. Perfect Serve Consult for Admission 4:35 PM 
 
ED Room Number: CZ08/14 Patient Name and age:  Bri Boo 80 y.o.  female Working Diagnosis: 1. Left-sided weakness 2. Paroxysmal A-fib ([address] COVID-19 Suspicion:  no 
Sepsis present:  no  Reassessment needed: no 
Code Status:  Full Code Readmission: no 
Isolation Requirements:  no 
Recommended Level of Care:  telemetry Department:[facility] ED - [phone] Other:

## 2020-09-22 ENCOUNTER — APPOINTMENT (OUTPATIENT)
Dept: GENERAL RADIOLOGY | Age: 85
DRG: 640 | End: 2020-09-22
Attending: PHYSICIAN ASSISTANT
Payer: MEDICARE

## 2020-09-22 ENCOUNTER — APPOINTMENT (OUTPATIENT)
Dept: NON INVASIVE DIAGNOSTICS | Age: 85
DRG: 640 | End: 2020-09-22
Attending: INTERNAL MEDICINE
Payer: MEDICARE

## 2020-09-22 LAB
ALBUMIN SERPL-MCNC: 3.2 G/DL (ref 3.5–5)
ALBUMIN/GLOB SERPL: 0.8 {RATIO} (ref 1.1–2.2)
ALP SERPL-CCNC: 69 U/L (ref 45–117)
ALT SERPL-CCNC: 32 U/L (ref 12–78)
ANION GAP SERPL CALC-SCNC: 5 MMOL/L (ref 5–15)
AST SERPL-CCNC: 53 U/L (ref 15–37)
ATRIAL RATE: 340 BPM
ATRIAL RATE: 57 BPM
BILIRUB SERPL-MCNC: 1.1 MG/DL (ref 0.2–1)
BUN SERPL-MCNC: 25 MG/DL (ref 6–20)
BUN/CREAT SERPL: 27 (ref 12–20)
CALCIUM SERPL-MCNC: 10.8 MG/DL (ref 8.5–10.1)
CALCULATED P AXIS, ECG09: 15 DEGREES
CALCULATED R AXIS, ECG10: -17 DEGREES
CALCULATED R AXIS, ECG10: -27 DEGREES
CALCULATED T AXIS, ECG11: 16 DEGREES
CALCULATED T AXIS, ECG11: 21 DEGREES
CHLORIDE SERPL-SCNC: 106 MMOL/L (ref 97–108)
CHOLEST SERPL-MCNC: 181 MG/DL
CO2 SERPL-SCNC: 25 MMOL/L (ref 21–32)
CREAT SERPL-MCNC: 0.94 MG/DL (ref 0.55–1.02)
DIAGNOSIS, 93000: NORMAL
DIAGNOSIS, 93000: NORMAL
ECHO AO ROOT DIAM: 3.1 CM
ECHO AR MAX VEL PISA: 445.29 CM/S
ECHO AV AREA PEAK VELOCITY: 1.97 CM2
ECHO AV AREA PEAK VELOCITY: 2.1 CM2
ECHO AV AREA PEAK VELOCITY: 2.43 CM2
ECHO AV AREA PEAK VELOCITY: 2.59 CM2
ECHO AV AREA VTI: 2.11 CM2
ECHO AV AREA/BSA VTI: 1.3 CM2/M2
ECHO AV MEAN GRADIENT: 6.86 MMHG
ECHO AV MEAN VELOCITY: 124 CM/S
ECHO AV PEAK GRADIENT: 13.06 MMHG
ECHO AV PEAK GRADIENT: 14.78 MMHG
ECHO AV PEAK GRADIENT: 79.41 MMHG
ECHO AV PEAK VELOCITY: 180.69 CM/S
ECHO AV PEAK VELOCITY: 192.24 CM/S
ECHO AV REGURGITANT PHT: 0.53 S
ECHO AV VTI: 32.18 CM
ECHO LA MAJOR AXIS: 2.89 CM
ECHO LA MINOR AXIS: 1.82 CM
ECHO LA VOL 2C: 45.57 ML (ref 22–52)
ECHO LA VOL 4C: 50.89 ML (ref 22–52)
ECHO LA VOL BP: 57.69 ML (ref 22–52)
ECHO LA VOL/BSA BIPLANE: 36.32 ML/M2 (ref 16–28)
ECHO LA VOLUME INDEX A2C: 28.69 ML/M2 (ref 16–28)
ECHO LA VOLUME INDEX A4C: 32.04 ML/M2 (ref 16–28)
ECHO LV INTERNAL DIMENSION DIASTOLIC: 3.84 CM (ref 3.9–5.3)
ECHO LV INTERNAL DIMENSION SYSTOLIC: 3.2 CM
ECHO LV IVSD: 1.08 CM (ref 0.6–0.9)
ECHO LV MASS 2D: 127.9 G (ref 67–162)
ECHO LV MASS INDEX 2D: 80.5 G/M2 (ref 43–95)
ECHO LV POSTERIOR WALL DIASTOLIC: 1.02 CM (ref 0.6–0.9)
ECHO LVOT DIAM: 1.98 CM
ECHO LVOT PEAK GRADIENT: 6.04 MMHG
ECHO LVOT PEAK GRADIENT: 9.15 MMHG
ECHO LVOT PEAK VELOCITY: 122.84 CM/S
ECHO LVOT PEAK VELOCITY: 151.28 CM/S
ECHO LVOT SV: 67.9 ML
ECHO LVOT VTI: 22 CM
ECHO MV A VELOCITY: 77.26 CM/S
ECHO MV E DECELERATION TIME (DT): 0.18 S
ECHO MV E VELOCITY: 82.5 CM/S
ECHO MV E/A RATIO: 1.07
ECHO PV PEAK INSTANTANEOUS GRADIENT SYSTOLIC: 4.59 MMHG
ECHO PV PEAK INSTANTANEOUS GRADIENT SYSTOLIC: 6.23 MMHG
ECHO PV REGURGITANT MAX VELOCITY: 106.3 CM/S
ECHO RV INTERNAL DIMENSION: 4.86 CM
ECHO TV REGURGITANT MAX VELOCITY: 244.25 CM/S
ECHO TV REGURGITANT PEAK GRADIENT: 18.88 MMHG
ECHO TV REGURGITANT PEAK GRADIENT: 23.86 MMHG
ECHO TV REGURGITANT PEAK GRADIENT: 23.86 MMHG
ERYTHROCYTE [DISTWIDTH] IN BLOOD BY AUTOMATED COUNT: 13.2 % (ref 11.5–14.5)
GLOBULIN SER CALC-MCNC: 3.8 G/DL (ref 2–4)
GLUCOSE SERPL-MCNC: 125 MG/DL (ref 65–100)
HAPTOGLOB SERPL-MCNC: 213 MG/DL (ref 30–200)
HCT VFR BLD AUTO: 37.2 % (ref 35–47)
HDLC SERPL-MCNC: 102 MG/DL
HDLC SERPL: 1.8 {RATIO} (ref 0–5)
HGB BLD-MCNC: 11.8 G/DL (ref 11.5–16)
IRON SATN MFR SERPL: 13 % (ref 20–50)
IRON SERPL-MCNC: 35 UG/DL (ref 35–150)
LDLC SERPL CALC-MCNC: 62.4 MG/DL (ref 0–100)
LIPID PROFILE,FLP: NORMAL
LVOT MG: 3.39 MMHG
MAGNESIUM SERPL-MCNC: 2 MG/DL (ref 1.6–2.4)
MCH RBC QN AUTO: 30.7 PG (ref 26–34)
MCHC RBC AUTO-ENTMCNC: 31.7 G/DL (ref 30–36.5)
MCV RBC AUTO: 96.9 FL (ref 80–99)
NRBC # BLD: 0 K/UL (ref 0–0.01)
NRBC BLD-RTO: 0 PER 100 WBC
P-R INTERVAL, ECG05: 188 MS
PLATELET # BLD AUTO: 257 K/UL (ref 150–400)
PMV BLD AUTO: 9.7 FL (ref 8.9–12.9)
POTASSIUM SERPL-SCNC: 5.3 MMOL/L (ref 3.5–5.1)
PROT SERPL-MCNC: 7 G/DL (ref 6.4–8.2)
Q-T INTERVAL, ECG07: 410 MS
Q-T INTERVAL, ECG07: 418 MS
QRS DURATION, ECG06: 100 MS
QRS DURATION, ECG06: 100 MS
QTC CALCULATION (BEZET), ECG08: 406 MS
QTC CALCULATION (BEZET), ECG08: 455 MS
RBC # BLD AUTO: 3.84 M/UL (ref 3.8–5.2)
SODIUM SERPL-SCNC: 136 MMOL/L (ref 136–145)
TIBC SERPL-MCNC: 267 UG/DL (ref 250–450)
TRIGL SERPL-MCNC: 83 MG/DL (ref ?–150)
VENTRICULAR RATE, ECG03: 57 BPM
VENTRICULAR RATE, ECG03: 74 BPM
VLDLC SERPL CALC-MCNC: 16.6 MG/DL
WBC # BLD AUTO: 6.2 K/UL (ref 3.6–11)

## 2020-09-22 PROCEDURE — 80053 COMPREHEN METABOLIC PANEL: CPT

## 2020-09-22 PROCEDURE — 83735 ASSAY OF MAGNESIUM: CPT

## 2020-09-22 PROCEDURE — 80061 LIPID PANEL: CPT

## 2020-09-22 PROCEDURE — 74011250637 HC RX REV CODE- 250/637: Performed by: FAMILY MEDICINE

## 2020-09-22 PROCEDURE — 36415 COLL VENOUS BLD VENIPUNCTURE: CPT

## 2020-09-22 PROCEDURE — 74011250637 HC RX REV CODE- 250/637: Performed by: INTERNAL MEDICINE

## 2020-09-22 PROCEDURE — 93306 TTE W/DOPPLER COMPLETE: CPT

## 2020-09-22 PROCEDURE — 83036 HEMOGLOBIN GLYCOSYLATED A1C: CPT

## 2020-09-22 PROCEDURE — 97161 PT EVAL LOW COMPLEX 20 MIN: CPT

## 2020-09-22 PROCEDURE — 97535 SELF CARE MNGMENT TRAINING: CPT

## 2020-09-22 PROCEDURE — 99218 HC RM OBSERVATION: CPT

## 2020-09-22 PROCEDURE — 97165 OT EVAL LOW COMPLEX 30 MIN: CPT

## 2020-09-22 PROCEDURE — 92610 EVALUATE SWALLOWING FUNCTION: CPT

## 2020-09-22 PROCEDURE — 74011250636 HC RX REV CODE- 250/636: Performed by: INTERNAL MEDICINE

## 2020-09-22 PROCEDURE — 77030038269 HC DRN EXT URIN PURWCK BARD -A

## 2020-09-22 PROCEDURE — 97530 THERAPEUTIC ACTIVITIES: CPT

## 2020-09-22 PROCEDURE — 99223 1ST HOSP IP/OBS HIGH 75: CPT | Performed by: PSYCHIATRY & NEUROLOGY

## 2020-09-22 PROCEDURE — 96372 THER/PROPH/DIAG INJ SC/IM: CPT

## 2020-09-22 PROCEDURE — 85027 COMPLETE CBC AUTOMATED: CPT

## 2020-09-22 PROCEDURE — 73030 X-RAY EXAM OF SHOULDER: CPT

## 2020-09-22 RX ORDER — LIDOCAINE HYDROCHLORIDE 10 MG/ML
2 INJECTION INFILTRATION; PERINEURAL ONCE
Status: DISCONTINUED | OUTPATIENT
Start: 2020-09-23 | End: 2020-09-23

## 2020-09-22 RX ORDER — LOSARTAN POTASSIUM 50 MG/1
25 TABLET ORAL DAILY
Status: DISCONTINUED | OUTPATIENT
Start: 2020-09-22 | End: 2020-09-25 | Stop reason: HOSPADM

## 2020-09-22 RX ORDER — BUPIVACAINE HYDROCHLORIDE 5 MG/ML
2 INJECTION, SOLUTION EPIDURAL; INTRACAUDAL ONCE
Status: COMPLETED | OUTPATIENT
Start: 2020-09-23 | End: 2020-09-23

## 2020-09-22 RX ORDER — TRIAMCINOLONE ACETONIDE 40 MG/ML
40 INJECTION, SUSPENSION INTRA-ARTICULAR; INTRAMUSCULAR ONCE
Status: COMPLETED | OUTPATIENT
Start: 2020-09-23 | End: 2020-09-23

## 2020-09-22 RX ADMIN — ASPIRIN 81 MG 81 MG: 81 TABLET ORAL at 08:54

## 2020-09-22 RX ADMIN — FAMOTIDINE 20 MG: 20 TABLET, FILM COATED ORAL at 17:49

## 2020-09-22 RX ADMIN — ACETAMINOPHEN 650 MG: 325 TABLET ORAL at 11:37

## 2020-09-22 RX ADMIN — FAMOTIDINE 20 MG: 20 TABLET, FILM COATED ORAL at 08:54

## 2020-09-22 RX ADMIN — LOSARTAN POTASSIUM 25 MG: 50 TABLET, FILM COATED ORAL at 08:54

## 2020-09-22 RX ADMIN — POTASSIUM CHLORIDE, DEXTROSE MONOHYDRATE AND SODIUM CHLORIDE 125 ML/HR: 150; 5; 450 INJECTION, SOLUTION INTRAVENOUS at 06:35

## 2020-09-22 RX ADMIN — POTASSIUM CHLORIDE 40 MEQ: 750 TABLET, FILM COATED, EXTENDED RELEASE ORAL at 02:35

## 2020-09-22 RX ADMIN — ATORVASTATIN CALCIUM 20 MG: 20 TABLET, FILM COATED ORAL at 21:26

## 2020-09-22 RX ADMIN — POTASSIUM CHLORIDE, DEXTROSE MONOHYDRATE AND SODIUM CHLORIDE 125 ML/HR: 150; 5; 450 INJECTION, SOLUTION INTRAVENOUS at 15:47

## 2020-09-22 RX ADMIN — ENOXAPARIN SODIUM 40 MG: 40 INJECTION SUBCUTANEOUS at 08:54

## 2020-09-22 RX ADMIN — ACETAMINOPHEN 650 MG: 325 TABLET ORAL at 03:44

## 2020-09-22 NOTE — CONSULTS
ORTHO CONSULT    Subjective:     Date of Consultation:  September 22, 2020    Referring Physician:  Dr. Mayur Dale. Wendi Medina is a 80 y.o. female we are consulted to see for L shoulder pain. Pt. States her L shoulder has chronic Osteoarthritis and sees Dr. Lonna Nageotte for this. States receives cortisone injections every 2-3 months for this which has helps somewhat. NKI, according to son (who is a pediatrician locally) she has had a flare up the last few weeks. Pt. denies fall. Denies numbness at this time. Last cortisone injection with Dr. Lonna Nageotte was over 2 months ago. She was scheduled for an injection next Tuesday. Patient Active Problem List    Diagnosis Date Noted    Anemia 09/21/2020    Hypokalemia 09/21/2020    Weakness 09/21/2020    Acute metabolic encephalopathy 00/94/8009    Hypotension 09/21/2020    Dehydration 09/21/2020    Renal cancer ([facility] [address] 09/02/2020    HTN (hypertension) 03/30/2018    High cholesterol 03/30/2018     Family History   Problem Relation Age of Onset    Hypertension Other       Social History     Tobacco Use    Smoking status: Never Smoker    Smokeless tobacco: Never Used   Substance Use Topics    Alcohol use: Not on file     Past Medical History:   Diagnosis Date    High cholesterol     Hypertension     Ovarian cancer ([facility] [address]     Renal cancer ([facility] [address] 9/2/2020    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 She is s/p a right robotic partial nephrectomy 3/20/17 for a t1a clear cell renal carcinoma      Past Surgical History:   Procedure Laterality Date    HX HYSTERECTOMY      HX KNEE REPLACEMENT      right 2010    HX NEPHRECTOMY Right 03/20/2017    PARTIAL     HX NEPHRECTOMY Left 03/26/2018    PARTIAL     HX SHOULDER REPLACEMENT      right 2011      Prior to Admission medications    Medication Sig Start Date End Date Taking? Authorizing Provider   olmesartan (BENICAR) 40 mg tablet Take 40 mg by mouth daily.    Yes Provider, Historical   hydroCHLOROthiazide (HYDRODIURIL) 25 mg tablet Take 25 mg by mouth daily. Yes Provider, Historical   docusate sodium (COLACE) 100 mg capsule Take 100 mg by mouth daily. Yes Provider, Historical   aspirin 81 mg chewable tablet Take 81 mg by mouth daily. Yes Provider, Historical   traMADoL (ULTRAM) 50 mg tablet Take 50 mg by mouth two (2) times a day. Yes Provider, Historical   cholecalciferol, vitamin D3, (VITAMIN D3) 2,000 unit tab Take 1 Tab by mouth daily. Yes Provider, Historical   simvastatin (ZOCOR) 40 mg tablet Take 40 mg by mouth nightly. Yes Ashley, MD Evelia   LECITHIN PO Take 1,200 mg by mouth daily. Yes Ashley, MD Evelia     Current Facility-Administered Medications   Medication Dose Route Frequency    losartan (COZAAR) tablet 25 mg  25 mg Oral DAILY    aspirin chewable tablet 81 mg  81 mg Oral DAILY    atorvastatin (LIPITOR) tablet 20 mg  20 mg Oral QHS    acetaminophen (TYLENOL) tablet 650 mg  650 mg Oral Q6H PRN    Or    acetaminophen (TYLENOL) suppository 650 mg  650 mg Rectal Q6H PRN    polyethylene glycol (MIRALAX) packet 17 g  17 g Oral DAILY PRN    ondansetron (ZOFRAN ODT) tablet 4 mg  4 mg Oral Q8H PRN    Or    ondansetron (ZOFRAN) injection 4 mg  4 mg IntraVENous Q6H PRN    famotidine (PEPCID) tablet 20 mg  20 mg Oral BID    enoxaparin (LOVENOX) injection 40 mg  40 mg SubCUTAneous DAILY    dextrose 5% - 0.45% NaCl with KCl 20 mEq/L infusion  125 mL/hr IntraVENous CONTINUOUS     Allergies   Allergen Reactions    Aleve [Naproxen Sodium] Hives    Codeine Nausea and Vomiting        Review of Systems:  A comprehensive review of systems was negative except for that written in the HPI. Objective:     Visit Vitals  /70 (BP 1 Location: Right arm, BP Patient Position: At rest;Supine)   Pulse 76   Temp 98.7 °F (37.1 °C)   Resp 16   Ht 5' 1\" (1.549 m)   Wt 60.3 kg (133 lb)   SpO2 97%   BMI 25.13 kg/m²       EXAM: I examined pt's L shoulder.  No edema, no effusion, no erythema. No ecchymosis noted. Pt has A and PROM but limited with pain in forward flexion and abduction. Internal rotation and external rotation do not elicit pain. No pain to palpation over the sub acromial region or the RUSTR [facility] joint. Severe crepitus with ROM. GH pain with ROM. NV intact distally all fingers. Radial pulses = BUE's. XR Results (most recent):     INDICATION: Shoulder and elbow pain.     COMPARISON: None.     FINDINGS:  3 radiographs of the left shoulder show end-stage glenohumeral osteoarthritis,  with little motion between internal and external rotation, remodeling of the  humeral head, obliteration of the joint space, and subchondral cysts in the  glenoid and humerus. No fracture or dislocation.     Please note: No radiographs of the humerus were ordered or obtained. A small  portion of the humeral diaphysis is excluded between shoulder and elbow  radiographs.     3 radiographs of the left elbow show no fracture, dislocation, or soft tissue  swelling. There is ossification of the triceps tendon insertion and mild elbow  osteoarthritis.     IMPRESSION  IMPRESSION:  1. End-stage glenohumeral osteoarthritis. 2. Mild elbow osteoarthritis. Assessment/Plan:     Encounter Diagnoses     ICD-10-CM ICD-9-CM   1. Left-sided weakness  R53.1 728.87   2. Paroxysmal A-fib ([facility])  I48.0 427.31     End Stage GH Osteoarthritis Left Shoulder    Plan- no active infection. Plan for cortisone injection in the [facility] joint L shoulder tomorrow morning. Kenalog, marcaine and lidocaine ordered for injection. Schedule for 0900hrs. Dr. Bucky Fernandez agrees with plan. Thank you for allowing us to take part in this patients care.       Karma Justin PA-C    Orthopaedic Surgery PA

## 2020-09-22 NOTE — PROGRESS NOTES
Problem: Mobility Impaired (Adult and Pediatric)  Goal: *Acute Goals and Plan of Care (Insert Text)  Description: FUNCTIONAL STATUS PRIOR TO ADMISSION: Patient was modified independent using a rollator for functional mobility. HOME SUPPORT PRIOR TO ADMISSION: The patient lived alone with daily morning care aide x 2 hrs to provide assistance with bathing and dressing. Physical Therapy Goals  Initiated 9/22/2020  1. Patient will move from supine to sit and sit to supine , scoot up and down, and roll side to side in bed with minimal assistance/contact guard assist within 7 day(s). 2.  Patient will transfer from bed to chair and chair to bed with minimal assistance/contact guard assist using the least restrictive device within 7 day(s). 3.  Patient will perform sit to stand with minimal assistance/contact guard assist within 7 day(s). 4.  Patient will ambulate with minimal assistance/contact guard assist for 25 feet with the least restrictive device within 7 day(s). Note:   PHYSICAL THERAPY EVALUATION  Patient: Ky Molina (42 y.o. female)  Date: 9/22/2020  Primary Diagnosis: Weakness [R53.1]        Precautions:   Bed Alarm, Fall    ASSESSMENT  Based on the objective data described below, the patient presents with left sided weakness, poor sitting balance with drift posterior and to the left, severely decreased ROM left shoulder, hip, and knee. Six days ago son reports patient went out into the community by ambulating 400' + to a car, entering and exiting the car, and sitting in waiting area and ambulating independently with rollator. Now patient unable to come to stance or take a single step due to arthritis pain and generalized weakness. Patient is being worked up for TIA and work up in progress. Prior to onset 6 days ago, patient was able to manage her toileting and meal needs with independent mobility using a rollator.  She was requiring 2 hrs assistance every am for bathing and dressing but then managed independently until she fell and was brought to ED. She is below baseline level of function and is unable to return at this time to independent living. Recommend daily PT to increase functional mobility in order to prevent complications of immobility. Recommend SNF. Son reports patient will need something stronger than tylenol in order to manage her end stage shoulder and knee joint pain. Current Level of Function Impacting Discharge (mobility/balance):  max x 1-2 for bed mobility and to stand; unable to perform SPTs    Functional Outcome Measure: The patient scored 0/100 on the Barthel Index outcome measure. Other factors to consider for discharge:      Patient will benefit from skilled therapy intervention to address the above noted impairments. PLAN :  Recommendations and Planned Interventions: bed mobility training, transfer training, gait training, therapeutic exercises, patient and family training/education, and therapeutic activities      Frequency/Duration: Patient will be followed by physical therapy:  5 times a week to address goals. Recommendation for discharge: (in order for the patient to meet his/her long term goals)  Therapy up to 5 days/week in SNF setting    This discharge recommendation:  Has not yet been discussed the attending provider and/or case management    IF patient discharges home will need the following DME: to be determined (TBD)         SUBJECTIVE:   Patient stated I cant take a step. Its too painful. 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  She is s/p a right robotic partial nephrectomy 3/20/17 for a t1a clear cell renal carcinoma     Past Surgical History:   Procedure Laterality Date    HX HYSTERECTOMY      HX KNEE REPLACEMENT      right 2010    HX NEPHRECTOMY Right 03/20/2017    PARTIAL     HX NEPHRECTOMY Left 03/26/2018    PARTIAL     HX SHOULDER REPLACEMENT      right 2011       Personal factors and/or comorbidities impacting plan of care:     Home Situation  Home Environment: Independent living  One/Two Story Residence: One story  Living Alone: Yes  Support Systems: Other (comments)(children)  Patient Expects to be Discharged to[de-identified] Independent living facility  Current DME Used/Available at Home: Raised toilet seat, Shower chair, Walker, rollator, Walker, rolling, Wheelchair    EXAMINATION/PRESENTATION/DECISION MAKING:   Critical Behavior:  Neurologic State: Alert  Orientation Level: Oriented X4  Cognition: Appropriate decision making, Decreased attention/concentration, Follows commands  Safety/Judgement: Insight into deficits  Hearing: Auditory  Auditory Impairment: None, Hard of hearing, bilateral  Hearing Aids/Status: At home  Skin:    Edema:   Range Of Motion:  AROM: Grossly decreased, non-functional           PROM: Grossly decreased, non-functional           Strength:    Strength: Grossly decreased, non-functional                    Tone & Sensation:   Tone: Normal              Sensation: Intact               Coordination:  Coordination: Grossly decreased, non-functional  Vision:      Functional Mobility:  Bed Mobility:  Rolling: Maximum assistance;Assist x2  Supine to Sit: Maximum assistance; Additional time;Assist x1;Bed Modified  Sit to Supine: Maximum assistance; Additional time;Assist x2;Bed Modified  Scooting: Maximum assistance; Additional time;Assist x2  Transfers:  Sit to Stand: Maximum assistance; Additional time;Assist x2  Stand to Sit: Moderate assistance; Additional time;Assist x2  Stand Pivot Transfers: (unable)                    Balance:   Sitting: Impaired  Sitting - Static: Fair (occasional)  Sitting - Dynamic: Poor (constant support)  Standing: Impaired  Standing - Static: Poor  Standing - Dynamic : Poor  Ambulation/Gait Training:  Distance (ft): 0 Feet (ft)  Assistive Device: Gait belt;Walker, rollator  Ambulation - Level of Assistance: Maximum assistance;Assist x2(unable to take any steps or stand erect due to pain)        Gait Abnormalities: Antalgic        Base of Support: Narrowed                              Stairs:     Stairs - Level of Assistance: (NT)        Therapeutic Exercises:       Functional Measure:  Barthel Index:    Bathin  Bladder: 0  Bowels: 0  Groomin  Dressin  Feedin  Mobility: 0  Stairs: 0  Toilet Use: 0  Transfer (Bed to Chair and Back): 0  Total: 0/100       The Barthel ADL Index: Guidelines  1. The index should be used as a record of what a patient does, not as a record of what a patient could do. 2. The main aim is to establish degree of independence from any help, physical or verbal, however minor and for whatever reason. 3. The need for supervision renders the patient not independent. 4. A patient's performance should be established using the best available evidence. Asking the patient, friends/relatives and nurses are the usual sources, but direct observation and common sense are also important. However direct testing is not needed. 5. Usually the patient's performance over the preceding 24-48 hours is important, but occasionally longer periods will be relevant. 6. Middle categories imply that the patient supplies over 50 per cent of the effort. 7. Use of aids to be independent is allowed. Katia Elizalde., Barthel, DIssaW. (5649). Functional evaluation: the Barthel Index. [facility] (14)2. ISADORA Stallworth, Oh Jewell, Gamaliel Nelson, Agustin, [address] [place] [address] (). Measuring the change indisability after inpatient rehabilitation; comparison of the responsiveness of the Barthel Index and Functional [place] Measure. Journal of Neurology, Neurosurgery, and Psychiatry, 66(4), 877-179. Denny Quesada, [place].LILLIE, VIANCA Heard, Power Gray MIssaA. (2004.) Assessment of post-stroke quality of life in cost-effectiveness studies: The usefulness of the Barthel Index and the EuroQoL-5D. Quality of Life Research, 15, 291-89           Physical Therapy Evaluation Charge Determination   History Examination Presentation Decision-Making   HIGH Complexity :3+ comorbidities / personal factors will impact the outcome/ POC  HIGH Complexity : 4+ Standardized tests and measures addressing body structure, function, activity limitation and / or participation in recreation  HIGH Complexity : Unstable and unpredictable characteristics  Other outcome measures Barthel Index  HIGH       Based on the above components, the patient evaluation is determined to be of the following complexity level: HIGH     Pain Rating:  High pain level but did not rate    Activity Tolerance:   Poor- pain too severe, Informed RN who administered tylenol  Please refer to the flowsheet for vital signs taken during this treatment. After treatment patient left in no apparent distress:   Supine in bed, Call bell within reach, Bed / chair alarm activated, and Caregiver / family present    COMMUNICATION/EDUCATION:   The patients plan of care was discussed with: Occupational therapist and Registered nurse. Fall prevention education was provided and the patient/caregiver indicated understanding. and Patient understands intent and goals of therapy, but is neutral about his/her participation.     Thank you for this referral.  Magen Harper, PT, DPT   Time Calculation: 26 mins

## 2020-09-22 NOTE — PROGRESS NOTES
Transition of Care Plan:  RUR-N/A  1. PT/OT evals  2. Likely will go to rehab at [address]  3. Will need 2 covid tests and a cxr  4. Transport method to be determined    CM Note:  Pt was off the floor for testing. I met with pt's son who stated she has been in [place] at [address] and now feels she will need rehab. It was explained that she will be evaluated by PT/OT/SLP who will recommend the level of therapy appropriate. CM role was explained to him and to pt. D/c transport to be determined.   JACKIE Vazquez

## 2020-09-22 NOTE — PROGRESS NOTES
Bedside and Verbal shift change report given to Sheron An (oncoming nurse) by [employer] (offgoing nurse). Report included the following information SBAR, Kardex and MAR.

## 2020-09-22 NOTE — CONSULTS
[facility]: [id number] [address] Neurology  [address]  [phone]      Name:   Tran Gallagher   Medical record #: [id number]  Admission Date: 9/21/2020     Who Consulted: Dr. Yoandy Nielson    Reason for Consult:  Eval and treat stroke    HISTORY OF PRESENT ILLNESS:     This is a 80 y.o. female who is admitted for weakness. Ms. Michael Bañuelos presented to the ED with a one week history of worsening left sided weakness. The patient reports that this is due to left shoulder pain, but her son reports that she has had progressive weakness with difficulty ambulating and getting out of bed. This morning  her home health aide was unable to get her  out of bed to go to the bathroom. Patient has no complaints of weakness but per the son she has been leaning and falling to the left since this morning. He called on the phone this morning to check on her and discovered that she was not acting like herself, having forgotten to take several of her medications. Upon admission to the ED she was normotensive but had impaired coordination. While in the ED she had a episode of afib. The Neurology Service is asked to evaluate for stroke. Neuro-imaging:     CT Head: No acute intracranial abnormality    CTA/CTP Head and Neck:   1. No abnormal perfusion. No large vessel occlusion. 2. 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 Recommend follow-up CTA of the head and neck in 2 weeks to reassess  3. Mediastinal lymphadenopathy without obvious etiology. Recommend dedicated CT of the chest for further evaluation. EKG: SR  Care Plan discussed with:  Patient x   Family x   RN    Care Manager    Consultant/Specialist:         Thank you for allowing the Neurology Service the pleasure of participating in the care of your patient.   This patient will be discussed with my collaborating care team physician,  Dr. Kip Nice, and he may have further recommendations regarding this patient's care      Estefany Lazo, ACNP-BC  ====================      Attending Attestation:         NEUROLOGY NOTE ADDENDUM:    9/22/2020      I have reviewed the documentation provided by the nurse practitioner, discussed her findings, clinical impression, and the proposed management plans with regards to this patient's encounter. I have personally performed a face to face diagnostic evaluation on this patient. My findings are as follows:      Eduarda Gutierrez is a 80 y.o. female who  has a past medical history of High cholesterol, Hypertension, Ovarian cancer ([facility] [address] and Renal cancer ([facility] [address] (9/2/2020). who presents for evaluation of weakness. Patient was apparently int he commode and unable to get up due to severe l shoulder pain and l knee problems. Has prior history of L shoulder pain seeing Ortho and had prior injections with some benefit. Exam:  Visit Vitals  /71 (BP 1 Location: Right arm, BP Patient Position: At rest)   Pulse 60   Temp 98.1 °F (36.7 °C)   Resp 16   Wt 60.3 kg (133 lb)   SpO2 98%   BMI 25.13 kg/m²     Gen: Awake, alert, follows commands  Appropriate appearance, normal speech. Oriented to all spheres. No visual field defect on confrontation exam.  Full eyes movement, with no nystagmus, no diplopia, no ptosis. Normal gag and swallow.   All remaining cranial nerves were normal  Motor function: Limited movement of L shoulder ROM due to severe pain  Sensory: intact to LT, PP and JPS  DTRs + in all extremities, (-) Babinski  Good FTN and HTS   Gait: Deferred      Assessment  L shoulder weakness due to severe arthritis of the shoulder  MRI brain negative for stroke  Possible dissection of vertebral artery at C2 without occlusion which more likely incidental in nature (denies recent trauma/falls)    Plan  1) Follow up with her ortho for her L shoulder pain  2) Physical therapy  3) Already on ASA 81 to address possible dissection      Thank you for the consultation. Leena Ma MD  Diplomate, American Board of Psychiatry and Neurology  Diplomate, Neuromuscular Medicine  Diplomate, American Board of Electrodiagnostic Medicine               Assessment/Plan:     1. Weakness and ataxia:    · ASA 81 mg  · Neurochecks:  Every 4 hours  · Blood Sugar Goal:  140-180  · BP Goal: Less than 160     Stroke work up  · A1C:  · LDL:   62.4  · TTE:    · MRI:  No acute process  · Carotid vascular imaging:  Possible dissection of vertebral artery at C2 without occlusion    Risk factors for stroke include:  HTN, HLD, physical inactivity  · Discussed with patient    · Discussed signs/symptoms of stroke and when to call 911      2. Mobility:   · Has not been OOB. · PT/OT to eval for rehab    3. Diet:    · Does not need SLP prior to po, passed STAND    4. VTE Prophylaxes:   · Per Primary team           Review of Systems: 10 point ROS was performed. Pertinent positives listed in HPI. Negative ROS is as follows. Pt denies: angina, palpitations, paresthesias, vision loss, slurred speech, aphasia, confusion, fever, chills, headache, diplopia, back pain, neck pain, prior episodes of vertigo, hallucinations, new medications or dosage changes. Allergies: Allergies   Allergen Reactions    Aleve [Naproxen Sodium] Hives    Codeine Nausea and Vomiting       Outpatient Meds  No current facility-administered medications on file prior to encounter. Current Outpatient Medications on File Prior to Encounter   Medication Sig Dispense Refill    olmesartan (BENICAR) 40 mg tablet Take 40 mg by mouth daily.  hydroCHLOROthiazide (HYDRODIURIL) 25 mg tablet Take 25 mg by mouth daily.  docusate sodium (COLACE) 100 mg capsule Take 100 mg by mouth daily.  aspirin 81 mg chewable tablet Take 81 mg by mouth daily.  traMADoL (ULTRAM) 50 mg tablet Take 50 mg by mouth two (2) times a day.       cholecalciferol, vitamin D3, (VITAMIN D3) 2,000 unit tab Take 1 Tab by mouth daily.  simvastatin (ZOCOR) 40 mg tablet Take 40 mg by mouth nightly.  LECITHIN PO Take 1,200 mg by mouth daily. Inpatient Meds    Current Facility-Administered Medications   Medication Dose Route Frequency Provider Last Rate Last Dose    losartan (COZAAR) tablet 25 mg  25 mg Oral DAILY Ted Matthew MD        aspirin chewable tablet 81 mg  81 mg Oral DAILY Markus Sheikh MD        atorvastatin (LIPITOR) tablet 20 mg  20 mg Oral QHS Markus Sheikh MD   20 mg at 09/21/20 2138    acetaminophen (TYLENOL) tablet 650 mg  650 mg Oral Q6H PRN Markus Sheikh MD   650 mg at 09/22/20 0344    Or    acetaminophen (TYLENOL) suppository 650 mg  650 mg Rectal Q6H PRN Markus Sheikh MD        polyethylene glycol [facility]) packet 17 g  17 g Oral DAILY PRN Markus Sheikh MD        ondansetron (ZOFRAN ODT) tablet 4 mg  4 mg Oral Q8H PRN Markus Sheikh MD        Or    ondansetron [facility]) injection 4 mg  4 mg IntraVENous Q6H PRN Markus Sheikh MD        famotidine (PEPCID) tablet 20 mg  20 mg Oral BID Markus Sheikh MD        enoxaparin (LOVENOX) injection 40 mg  40 mg SubCUTAneous DAILY Markus Sheikh MD        dextrose 5% - 0.45% NaCl with KCl 20 mEq/L infusion  125 mL/hr IntraVENous CONTINUOUS Markus Sheikh  mL/hr at 09/22/20 7461 125 mL/hr at 09/22/20 1352           Past Medical History:   Diagnosis Date    High cholesterol     Hypertension     [facility] [address]     Renal cancer (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  She is s/p a right robotic partial nephrectomy 3/20/17 for a t1a clear cell renal carcinoma       Past Surgical History:   Procedure Laterality Date    HX HYSTERECTOMY      HX KNEE REPLACEMENT      right 2010    HX NEPHRECTOMY Right 03/20/2017    PARTIAL     HX NEPHRECTOMY Left 03/26/2018    PARTIAL     HX SHOULDER REPLACEMENT      right 2011 family history includes Hypertension in an other family member. reports that she has never smoked. She has never used smokeless tobacco.           Lab Results (last 24 hrs)  Recent Results (from the past 24 hour(s))   METABOLIC PANEL, COMPREHENSIVE    Collection Time: 09/21/20 12:59 PM   Result Value Ref Range    Sodium 136 136 - 145 mmol/L    Potassium 3.4 (L) 3.5 - 5.1 mmol/L    Chloride 102 97 - 108 mmol/L    CO2 30 21 - 32 mmol/L    Anion gap 4 (L) 5 - 15 mmol/L    Glucose 97 65 - 100 mg/dL    BUN 38 (H) 6 - 20 MG/DL    Creatinine 0.90 0.55 - 1.02 MG/DL    BUN/Creatinine ratio 42 (H) 12 - 20      GFR est AA >60 >60 ml/min/1.73m2    GFR est non-AA 59 (L) >60 ml/min/1.73m2    Calcium 11.0 (H) 8.5 - 10.1 MG/DL    Bilirubin, total 1.0 0.2 - 1.0 MG/DL    ALT (SGPT) 32 12 - 78 U/L    AST (SGOT) 61 (H) 15 - 37 U/L    Alk. phosphatase 69 45 - 117 U/L    Protein, total 7.1 6.4 - 8.2 g/dL    Albumin 3.4 (L) 3.5 - 5.0 g/dL    Globulin 3.7 2.0 - 4.0 g/dL    A-G Ratio 0.9 (L) 1.1 - 2.2     CBC WITH AUTOMATED DIFF    Collection Time: 09/21/20 12:59 PM   Result Value Ref Range    WBC 6.9 3.6 - 11.0 K/uL    RBC 3.62 (L) 3.80 - 5.20 M/uL    HGB 11.1 (L) 11.5 - 16.0 g/dL    HCT 34.4 (L) 35.0 - 47.0 %    MCV 95.0 80.0 - 99.0 FL    MCH 30.7 26.0 - 34.0 PG    MCHC 32.3 30.0 - 36.5 g/dL    RDW 13.2 11.5 - 14.5 %    PLATELET 525 614 - 794 K/uL    MPV 9.4 8.9 - 12.9 FL    NRBC 0.0 0  WBC    ABSOLUTE NRBC 0.00 0.00 - 0.01 K/uL    NEUTROPHILS 78 (H) 32 - 75 %    LYMPHOCYTES 11 (L) 12 - 49 %    MONOCYTES 10 5 - 13 %    EOSINOPHILS 1 0 - 7 %    BASOPHILS 0 0 - 1 %    IMMATURE GRANULOCYTES 0 0.0 - 0.5 %    ABS. NEUTROPHILS 5.3 1.8 - 8.0 K/UL    ABS. LYMPHOCYTES 0.8 0.8 - 3.5 K/UL    ABS. MONOCYTES 0.7 0.0 - 1.0 K/UL    ABS. EOSINOPHILS 0.1 0.0 - 0.4 K/UL    ABS. BASOPHILS 0.0 0.0 - 0.1 K/UL    ABS. IMM.  GRANS. 0.0 0.00 - 0.04 K/UL    DF SMEAR SCANNED      RBC COMMENTS NORMOCYTIC, NORMOCHROMIC     PROTHROMBIN TIME + INR Collection Time: 09/21/20 12:59 PM   Result Value Ref Range    INR 1.0 0.9 - 1.1      Prothrombin time 10.4 9.0 - 11.1 sec   SAMPLES BEING HELD    Collection Time: 09/21/20 12:59 PM   Result Value Ref Range    SAMPLES BEING HELD 1RD,1SST     COMMENT        Add-on orders for these samples will be processed based on acceptable specimen integrity and analyte stability, which may vary by analyte. FERRITIN    Collection Time: 09/21/20 12:59 PM   Result Value Ref Range    Ferritin 93 8 - 252 NG/ML   FOLATE    Collection Time: 09/21/20 12:59 PM   Result Value Ref Range    Folate 18.6 5.0 - 21.0 ng/mL   LD    Collection Time: 09/21/20 12:59 PM   Result Value Ref Range     81 - 246 U/L   RETICULOCYTE COUNT    Collection Time: 09/21/20 12:59 PM   Result Value Ref Range    Reticulocyte count 1.2 0.7 - 2.1 %    Absolute Retic Cnt. 0.0431 0.0164 - 0.0776 M/ul   TSH 3RD GENERATION    Collection Time: 09/21/20 12:59 PM   Result Value Ref Range    TSH 1.43 0.36 - 3.74 uIU/mL   VITAMIN B12    Collection Time: 09/21/20 12:59 PM   Result Value Ref Range    Vitamin B12 562 193 - 986 pg/mL   URINALYSIS W/MICROSCOPIC    Collection Time: 09/21/20  5:11 PM   Result Value Ref Range    Color YELLOW/STRAW      Appearance CLEAR CLEAR      Specific gravity 1.022 1.003 - 1.030      pH (UA) 7.0 5.0 - 8.0      Protein Negative NEG mg/dL    Glucose Negative NEG mg/dL    Ketone TRACE (A) NEG mg/dL    Bilirubin Negative NEG      Blood Negative NEG      Urobilinogen 1.0 0.2 - 1.0 EU/dL    Nitrites Negative NEG      Leukocyte Esterase MODERATE (A) NEG      WBC 5-10 0 - 4 /hpf    RBC 0-5 0 - 5 /hpf    Epithelial cells FEW FEW /lpf    Bacteria Negative NEG /hpf    Hyaline cast 2-5 0 - 5 /lpf   URINE CULTURE HOLD SAMPLE    Collection Time: 09/21/20  5:11 PM    Specimen: Serum; Urine   Result Value Ref Range    Urine culture hold        Urine on hold in Microbiology dept for 2 days.   If unpreserved urine is submitted, it cannot be used for addtional testing after 24 hours, recollection will be required.    DRUG SCREEN, URINE    Collection Time: 09/21/20  6:10 PM   Result Value Ref Range    AMPHETAMINES Negative NEG      BARBITURATES Negative NEG      BENZODIAZEPINES Negative NEG      COCAINE Negative NEG      METHADONE Negative NEG      OPIATES Negative NEG      PCP(PHENCYCLIDINE) Negative NEG      THC (TH-CANNABINOL) Negative NEG      Drug screen comment (NOTE)    SARS-COV-2    Collection Time: 09/21/20  6:10 PM   Result Value Ref Range    Specimen source Nasopharyngeal      SARS-CoV-2 PENDING     Specimen source Nasopharyngeal      COVID-19 rapid test PENDING     Specimen type NP Swab      Health status Symptomatic Testing      COVID-19 PENDING    CBC W/O DIFF    Collection Time: 09/22/20  6:58 AM   Result Value Ref Range    WBC 6.2 3.6 - 11.0 K/uL    RBC 3.84 3.80 - 5.20 M/uL    HGB 11.8 11.5 - 16.0 g/dL    HCT 37.2 35.0 - 47.0 %    MCV 96.9 80.0 - 99.0 FL    MCH 30.7 26.0 - 34.0 PG    MCHC 31.7 30.0 - 36.5 g/dL    RDW 13.2 11.5 - 14.5 %    PLATELET 625 760 - 562 K/uL    MPV 9.7 8.9 - 12.9 FL    NRBC 0.0 0  WBC    ABSOLUTE NRBC 0.00 0.00 - 0.01 K/uL

## 2020-09-22 NOTE — PROGRESS NOTES
Problem: Self Care Deficits Care Plan (Adult)  Goal: *Acute Goals and Plan of Care (Insert Text)  Description: FUNCTIONAL STATUS PRIOR TO ADMISSION: Patient was modified independent using a rollator for functional mobility. Pt required assistance for ADLs, mostly for bathing and dressing. Pt reports needing increased assistance with UB bathing/dressing due to impaired ROM. HOME SUPPORT PRIOR TO ADMISSION: The patient lived alone with daily morning care aide x 2 hrs to provide assistance with bathing and dressing. Occupational Therapy Goals  Initiated 9/22/2020  1. Patient will perform static standing with CGA for 1 minute in preparation for OOB ADLs within 7 day(s). 2.  Patient will perform upper body dressing with moderate assistance within 7 day(s). 3.  Patient will perform lower body dressing and bathing with minimal assistance within 7 day(s). 4.  Patient will perform toilet transfers with minimal assistance/contact guard assist within 7 day(s). 5.  Patient will perform all aspects of toileting with minimal assistance/contact guard assist within 7 day(s). 6.  Patient will participate in upper extremity therapeutic exercise/activities with supervision/set-up for 3 minutes within 7 day(s). 7.  Patient will utilize energy conservation techniques during functional activities with verbal cues within 7 day(s). Outcome: Progressing Towards Goal     OCCUPATIONAL THERAPY EVALUATION  Patient: Shelia Gutiérrez (26 y.o. female)  Date: 9/22/2020  Primary Diagnosis: Weakness [R53.1]        Precautions:  Bed Alarm, Fall    ASSESSMENT  Based on the objective data described below, the patient presents with weakness (L < R), decreased sitting balance, and impaired ROM and coordination following admission for weakness and near fall. Workup for TIA in progress, but CT head negative thus far. Pt is received sitting EOB with PT present, preparing to attempt to stand.  She is unable to obtain full standing posture with assist x 2 and is returned to sitting EOB for LB bathing tasks due to incontinence in standing. Noted slight L lateral and posterior leaning and pt requires cues and physical assistance to correct. Pt requires increased time and cueing for tasks and family present reports pt is functioning well below her baseline. Pt unable to complete full Fugl-Maldonado due to fatigue and pain in L elbow/shoulder (pt held onto a grab bar with L UE to prevent fall at home). Recommend SNF-level rehab at discharge to maximize return to functional baseline. Current Level of Function Impacting Discharge (ADLs/self-care): overall mod to max A dressing and bathing; set up to min A seated feeding and grooming; Assist x 2 for mobility. Functional Outcome Measure: The patient scored Total: 0/100 on the Barthel Index outcome measure which is indicative of 100% impaired ability to care for basic self needs/dependency on others; inferred 100% dependency on others for instrumental ADLs. Other factors to consider for discharge: L UE pain; assist x 2; high fall risk     Patient will benefit from skilled therapy intervention to address the above noted impairments. PLAN :  Recommendations and Planned Interventions: self care training, functional mobility training, therapeutic exercise, balance training, therapeutic activities, endurance activities, patient education, home safety training and family training/education    Frequency/Duration: Patient will be followed by occupational therapy 5 times a week to address goals. Recommendation for discharge: (in order for the patient to meet his/her long term goals)  Therapy up to 5 days/week in SNF setting    This discharge recommendation:  Has been made in collaboration with the attending provider and/or case management    IF patient discharges home will need the following DME: TBD       SUBJECTIVE:   Patient stated I can't take a step. 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 She is s/p a right robotic partial nephrectomy 3/20/17 for a t1a clear cell renal carcinoma     Past Surgical History:   Procedure Laterality Date    HX HYSTERECTOMY      HX KNEE REPLACEMENT      right 2010    HX NEPHRECTOMY Right 03/20/2017    PARTIAL     HX NEPHRECTOMY Left 03/26/2018    PARTIAL     HX SHOULDER REPLACEMENT      right 2011       Expanded or extensive additional review of patient history:     Home Situation  Home Environment: Independent living  One/Two Story Residence: One story  Living Alone: Yes  Support Systems: Other (comments)(children)  Patient Expects to be Discharged to[de-identified] Independent living facility  Current DME Used/Available at Home: Raised toilet seat, Shower chair, Walker, rollator, Walker, rolling, Wheelchair    Hand dominance: Right    EXAMINATION OF PERFORMANCE DEFICITS:  Cognitive/Behavioral Status:  Neurologic State: Alert  Orientation Level: Oriented X4  Cognition: Appropriate decision making;Decreased attention/concentration; Follows commands  Perception: Cues to attend to left side of body  Perseveration: No perseveration noted  Safety/Judgement: Insight into deficits    Hearing: Auditory  Auditory Impairment: None, Hard of hearing, bilateral  Hearing Aids/Status: At home    Vision/Perceptual:    Corrective Lenses: Glasses    Range of Motion:  AROM: Grossly decreased, non-functional  PROM: Grossly decreased, non-functional    Strength:  Strength: Grossly decreased, non-functional    Coordination:  Coordination: Grossly decreased, non-functional  Fine Motor Skills-Upper: Right Intact; Left Intact(limited by weakness; generally slowed)    Gross Motor Skills-Upper: Left Intact; Right Intact    Tone & Sensation:  Tone: Normal  Sensation: Intact    Balance:  Sitting: Impaired  Sitting - Static: Fair (occasional)  Sitting - Dynamic: Poor (constant support)  Standing: Impaired  Standing - Static: Poor  Standing - Dynamic : Poor    Functional Mobility and Transfers for ADLs:  Bed Mobility:  Rolling: Maximum assistance;Assist x2  Supine to Sit: Maximum assistance; Additional time;Assist x1;Bed Modified  Sit to Supine: Maximum assistance; Additional time;Assist x2;Bed Modified  Scooting: Maximum assistance; Additional time;Assist x2    Transfers:  Sit to Stand: Maximum assistance; Additional time;Assist x2  Stand to Sit: Moderate assistance; Additional time;Assist x2  Toilet Transfer : Maximum assistance;Assist x2(infer based on observations)    ADL Assessment:  Feeding: Setup;Minimum assistance(min A to manage bimanual tasks)    Oral Facial Hygiene/Grooming: Minimum assistance; Moderate assistance(seated)    Bathing: Maximum assistance    Upper Body Dressing: Maximum assistance    Lower Body Dressing: Maximum assistance    Toileting: Maximum assistance; Total assistance    ADL Intervention and task modifications:  Feeding  Cutting Food: Maximum assistance  Utensil Management: Set-up(able to manipulate fork with R hand)  Food to Mouth: Set-up(with R hand; able to bring bite of lasanga to mouth)  Cues: Physical assistance; Tactile cues provided;Verbal cues provided    Lower Body Bathing  Lower Body : Moderate assistance;Maximum assistance(pt able to wash upper thighs to knees; A for lower LEs/feet)  Position Performed: Seated edge of bed  Cues: Physical assistance; Tactile cues provided;Verbal cues provided;Visual cues provided    Upper Body [address]: Maximum assistance(pt with limited ROM and weakness)  Cues: Physical assistance; Tactile cues provided;Verbal cues provided;Visual cues provided    Lower Body Dressing Assistance  Socks:  Total assistance (dependent)  Leg Crossed Method Used: No  Position Performed: Seated edge of bed  Cues: Don;Doff;Physical assistance    Cognitive Retraining  Safety/Judgement: Insight into deficits    Functional Measure:  Barthel Index:    Bathin  Bladder: 0  Bowels: 0  Groomin  Dressin  Feedin  Mobility: 0  Stairs: 0  Toilet Use: 0  Transfer (Bed to Chair and Back): 0  Total: 0/100        The Barthel ADL Index: Guidelines  1. The index should be used as a record of what a patient does, not as a record of what a patient could do. 2. The main aim is to establish degree of independence from any help, physical or verbal, however minor and for whatever reason. 3. The need for supervision renders the patient not independent. 4. A patient's performance should be established using the best available evidence. Asking the patient, friends/relatives and nurses are the usual sources, but direct observation and common sense are also important. However direct testing is not needed. 5. Usually the patient's performance over the preceding 24-48 hours is important, but occasionally longer periods will be relevant. 6. Middle categories imply that the patient supplies over 50 per cent of the effort. 7. Use of aids to be independent is allowed. Randal Burden., Barthel, D.W. ([id number]). Functional evaluation: the Barthel Index. [address] [facility] (14)2. Marilouemili Bone jaimee ISADORA Portillo, Melanie Jackman., [facility], [address] [place] [address] (). Measuring the change indisability after inpatient rehabilitation; comparison of the responsiveness of the Barthel Index and Functional [place] Measure. Journal of Neurology, Neurosurgery, and Psychiatry, 66(4), 571-335. Mercedes Carrasco, N.[email or web address].A, VIANCA Heard, & Hayden Parra MIssaA. (2004.) Assessment of post-stroke quality of life in cost-effectiveness studies: The usefulness of the Barthel Index and the EuroQoL-5D.  Quality of Life Research, 15, 088-99     Occupational Therapy Evaluation Charge Determination   History Examination Decision-Making   LOW Complexity : Brief history review HIGH Complexity : 5 or more performance deficits relating to physical, cognitive , or psychosocial skils that result in activity limitations and / or participation restrictions MEDIUM Complexity : Patient may present with comorbidities that affect occupational performnce. Miniml to moderate modification of tasks or assistance (eg, physical or verbal ) with assesment(s) is necessary to enable patient to complete evaluation       Based on the above components, the patient evaluation is determined to be of the following complexity level: LOW   Pain Rating:  Pt reporting elevated pain in L elbow    Activity Tolerance:   Fair  Please refer to the flowsheet for vital signs taken during this treatment. After treatment patient left in no apparent distress:    Supine in bed, Call bell within reach, Caregiver / family present and Side rails x 3    COMMUNICATION/EDUCATION:   The patients plan of care was discussed with: Physical therapist and Registered nurse. Home safety education was provided and the patient/caregiver indicated understanding., Patient/family have participated as able in goal setting and plan of care. and Patient/family agree to work toward stated goals and plan of care. This patients plan of care is appropriate for delegation to [facility].     Thank you for this referral.  Amaury Colbert OT  Time Calculation: 23 mins

## 2020-09-22 NOTE — PROGRESS NOTES
SPEECH PATHOLOGY BEDSIDE SWALLOW EVALUATION/DISCHARGE  Patient: Jane Joyce (73 y.o. female)  Date: 9/22/2020  Primary Diagnosis: Weakness [R53.1]       Precautions: aspiration       ASSESSMENT :  Based on the objective data described below, the patient presents with mildly weak pharyngeal swallow. She takes small amounts of PO with small bites due to h/o esophageal dysphagia. If nutrition is not sufficient, she has risk for declined pharyngeal strength and aspiration/choking. Admitted 9=21=20 with L weaknss, anemia. Head CT: WM dz. PMH:  Shoulder issues with surgery. XOL, HTN, ovarian CA, renal CA 2017, increasing weakness. Lives at [facility]. .  Skilled acute therapy provided by a speech-language pathologist is not indicated at this time. PLAN :  Recommendations:  Ok to continue regular diet, thin liquids. Defer supplements to RD. Discharge Recommendations: None     SUBJECTIVE:   Patient stated I have esophageal something. .. I have to be careful and take small bites of it comes back up. Not in vomit, but sometimes it's just phlegm. .    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  She is s/p a right robotic partial nephrectomy 3/20/17 for a t1a clear cell renal carcinoma     Past Surgical History:   Procedure Laterality Date    HX HYSTERECTOMY      HX KNEE REPLACEMENT      right 2010    HX NEPHRECTOMY Right 03/20/2017    PARTIAL     HX NEPHRECTOMY Left 03/26/2018    PARTIAL     HX SHOULDER REPLACEMENT      right 2011     Prior Level of Function/Home Situation:   Home Situation  Home Environment: Independent living  One/Two Story Residence: One story  Living Alone: Yes  Support Systems: Other (comments)(children)  Patient Expects to be Discharged to[de-identified] Independent living facility  Current DME Used/Available at Home: Marlyne Oiler, rollator  Diet prior to admission: regular, thins  Current Diet:  Regular, thins   Cognitive and Communication Status:  Neurologic State: Alert  Orientation Level: Oriented to person, Oriented to place, Oriented to situation, Oriented to time  Cognition: Follows commands  Perception: Appears intact     Safety/Judgement: Insight into deficits  Oral Assessment:  Oral Assessment  Labial: No impairment  Dentition: Natural  Oral Hygiene: WFL  Lingual: No impairment  Velum: No impairment  Mandible: No impairment  P.O. Trials:  Patient Position: upright in bed  Vocal quality prior to P.O.: No impairment  Consistency Presented: Solid; Thin liquid(seen at [facility])  How Presented: Self-fed/presented;Spoon;Straw;Successive swallows   ORAL PHASE:   Bolus Acceptance: No impairment  Bolus Formation/Control: No impairment     Propulsion: No impairment  Oral Residue: None   PHARYNGEAL PHASE:   Initiation of Swallow: No impairment  Laryngeal Elevation: Weak(mild)  Aspiration Signs/Symptoms: None      Patient reports h/o esophageal dysphagia with regurgitation at times. She has to take small bites. Suspect this may be impacting her PO nutrition. NOMS:   The NOMS functional outcome measure was used to quantify this patient's level of swallowing impairment. Based on the NOMS, the patient was determined to be at level 6 for swallow function     NOMS Swallowing Levels:  Level 1 (CN): NPO  Level 2 (CM): NPO but takes consistency in therapy  Level 3 (CL): Takes less than 50% of nutrition p.o. and continues with nonoral feedings; and/or safe with mod cues; and/or max diet restriction  Level 4 (CK):  Safe swallow but needs mod cues; and/or mod diet restriction; and/or still requires some nonoral feeding/supplements  Level 5 (CJ): Safe swallow with min diet restriction; and/or needs min cues  Level 6 (CI): Independent with p.o.; rare cues; usually self cues; may need to avoid some foods or needs extra time  Level 7 ([address] [place] [address]): Independent for all p.o.  ARABELLA. (2003). National Outcomes Measurement System (NOMS): Adult Speech-Language Pathology User's Guide. Pain:  Pain Scale 1: Numeric (0 - 10)  Pain Intensity 1: 3  Pain Location 1: Back  After treatment:   Patient left in no apparent distress in bed and Nursing notified    COMMUNICATION/EDUCATION:   Patient was educated regarding her deficit(s) of dysphagia as this relates to her diagnosis of weakness. She demonstrated Good understanding as evidenced by discussion. .    The patient's plan of care including recommendations, planned interventions, and recommended diet changes were discussed with: Registered nurse and neuro.      Thank you for this referral.  GABI Carnes  Time Calculation: 20 mins

## 2020-09-22 NOTE — PROGRESS NOTES
Occupational Therapy:  09/22/20    Orders received, chart reviewed and patient evaluated by occupational therapy. Pending progression with skilled acute occupational therapy, recommend:  Therapy up to 5 days/week in SNF setting     Recommend with nursing patient to complete as able in order to maintain strength, endurance and independence: bed level toileting vs. 2 person assist for SPT to [facility]. Thank you for your assistance. Full evaluation to follow.      Thank you,  Tiera Esposito, OTR/L

## 2020-09-22 NOTE — PROGRESS NOTES
Daily Progress Note: 9/22/2020  Enedelia Valencia MD    Assessment/Plan:   Weakness - POA, likely due to dehydration, but check TIA workup. Not a TPA candidate. Admit to remote tele. Neuro checks and neurology consult. Fall precautions and PT/OT/speech evaluation. Monitor BP and check A1c and lipid panel. Check MRI brain, ECHO. Continue ASA and statin     Acute metabolic encephalopathy - POA, mild. Likely due to dehydration in setting of early dementia, ultram may contribute. Hydrate. Check drug screen. Outpatient neuropsych testing. Hold ultram     Dehydration / Hypokalemia - POA due to poor PO intake. Hydrate and replete K. Hold HCTZ      Hypotension / HTN (hypertension) - POA, stop HCTZ for now and see if she feels better, start low dose Lower dose ARB or ARB will be added - permissive HTN in 81 yo woman.     Anemia - POA, mild, but will worsen with hydration. Check serologies.     High cholesterol - Check panel. Continue simvastatin. Hx Renal cancer - Outpatient follow up.        Problem List:  Problem List as of 9/22/2020 Date Reviewed: 9/21/2020          Codes Class Noted - Resolved    Anemia ICD-10-CM: D64.9  ICD-9-CM: 285.9  9/21/2020 - Present        Hypokalemia ICD-10-CM: E87.6  ICD-9-CM: 276.8  9/21/2020 - Present        Weakness ICD-10-CM: R53.1  ICD-9-CM: 780.79  9/21/2020 - Present        Acute metabolic encephalopathy PWY-13-AM: G93.41  ICD-9-CM: 348.31  9/21/2020 - Present        Hypotension ICD-10-CM: I95.9  ICD-9-CM: 458.9  9/21/2020 - Present        Dehydration ICD-10-CM: E86.0  ICD-9-CM: 276.51  9/21/2020 - Present        Renal cancer ([facility] [address] ICD-10-CM: C64.9  ICD-9-CM: 189.0  9/2/2020 - Present    Overview Signed 9/21/2020  5:31 PM by Ariel Collins MD     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 She is s/p a right robotic partial nephrectomy 3/20/17 for a t1a clear cell renal carcinoma             RESOLVED: Urinary retention ICD-10-CM: R33.9  ICD-9-CM: 788.20  3/31/2018 - 2020        RESOLVED: Acute arthritis ICD-10-CM: M19.90  ICD-9-CM: 716.90  3/31/2018 - 2020        RESOLVED: Elevated serum creatinine ICD-10-CM: R79.89  ICD-9-CM: 790.99  3/30/2018 - 2020        RESOLVED: Hyperglycemia ICD-10-CM: R73.9  ICD-9-CM: 790.29  3/30/2018 - 2020              Subjective:     80 y.o.  female who presented to the Emergency Department complaining of weakness. Noted to worsen over a week, with perhaps left weakness greater than right. Per patient this is due to severe R shoulder pain. Also more confused, but better in ER now. She reports not eating and drinking well due to pain, and taking extra Ultram. ER finds dehydration and normal head imaging. We will admit her for obeservation. (Dr Truyd Davila). : No complaints this AM except \"feel weak. \"  She is not sure why she is here. She may need inpt rehab.       Review of Systems:   A comprehensive review of systems was negative except for that written in the HPI.     Objective:   Physical Exam:     Visit Vitals  BP (!) 169/67 (BP 1 Location: Right arm, BP Patient Position: At rest)   Pulse 84   Temp 97.9 °F (36.6 °C)   Resp 18   Wt 60.3 kg (133 lb)   SpO2 98%   BMI 25.13 kg/m²      O2 Device: Room air    Temp (24hrs), Av °F (36.7 °C), Min:97.8 °F (36.6 °C), Max:98.3 °F (36.8 °C)     1901 -  0700  In: 200 [P.O.:200]  Out: 250 [Urine:250]   No intake/output data recorded. General:  Alert, cooperative, frail appearing, no distress, appears stated age. Head:  Normocephalic, without obvious abnormality, atraumatic. Eyes:  Conjunctivae/corneas clear. PERRL, EOMs intact. Nose: Nares normal. Septum midline. Mucosa normal. No drainage or sinus tenderness. Throat: Lips, mucosa, and tongue moist..   Neck: Supple, symmetrical, trachea midline, no adenopathy, thyroid: no enlargement/tenderness/nodules, no carotid bruit and no JVD. Lungs:   Clear to auscultation bilaterally. Chest wall:  No tenderness or deformity. Heart:  Regular rate and rhythm, S1, S2 normal, no murmur, click, rub or gallop. Abdomen:   Soft, non-tender. Bowel sounds normal. No masses,  No organomegaly. Extremities: no cyanosis or edema. No calf tenderness or cords. Pulses: 2+ and symmetric all extremities. Skin: Skin color, texture, turgor normal. No rashes   Neurologic:   Alert and oriented X 2-3. Knows she is in [facility] but not day/date/month at this time. Vague answers to some questions.  equally weak. No cogwheeling or rigidity. Gait not tested at this time. Sensation grossly normal to touch. Gross motor of extremities normal.       Data Review:     EXAM: MRI BRAIN WO CONT     INDICATION: tia     COMPARISON: July 14, 2014     CONTRAST: None.     TECHNIQUE:    Multiplanar multisequence acquisition without contrast of the brain.     FINDINGS:  Diffusion imaging does not show acute ischemic changes. There is no extra-axial fluid collection, hemorrhage or shift. Flow voids in major vessels at the base of the brain are present. There is no mass. There is moderate atrophy. There is mild nonspecific white matter changes.        IMPRESSION  IMPRESSION:  1. No acute findings no mass. 2. Atrophy and white matter disease.   Recent Days:  Recent Labs     09/21/20  1259   WBC 6.9   HGB 11.1*   HCT 34.4*    Recent Labs     09/21/20  1259      K 3.4*      CO2 30   GLU 97   BUN 38*   CREA 0.90   CA 11.0*   ALB 3.4*   TBILI 1.0   ALT 32   INR 1.0     No results for input(s): PH, PCO2, PO2, HCO3, FIO2 in the last 72 hours. 24 Hour Results:  Recent Results (from the past 24 hour(s))   METABOLIC PANEL, COMPREHENSIVE    Collection Time: 09/21/20 12:59 PM   Result Value Ref Range    Sodium 136 136 - 145 mmol/L    Potassium 3.4 (L) 3.5 - 5.1 mmol/L    Chloride 102 97 - 108 mmol/L    CO2 30 21 - 32 mmol/L    Anion gap 4 (L) 5 - 15 mmol/L    Glucose 97 65 - 100 mg/dL    BUN 38 (H) 6 - 20 MG/DL    Creatinine 0.90 0.55 - 1.02 MG/DL    BUN/Creatinine ratio 42 (H) 12 - 20      GFR est AA >60 >60 ml/min/1.73m2    GFR est non-AA 59 (L) >60 ml/min/1.73m2    Calcium 11.0 (H) 8.5 - 10.1 MG/DL    Bilirubin, total 1.0 0.2 - 1.0 MG/DL    ALT (SGPT) 32 12 - 78 U/L    AST (SGOT) 61 (H) 15 - 37 U/L    Alk. phosphatase 69 45 - 117 U/L    Protein, total 7.1 6.4 - 8.2 g/dL    Albumin 3.4 (L) 3.5 - 5.0 g/dL    Globulin 3.7 2.0 - 4.0 g/dL    A-G Ratio 0.9 (L) 1.1 - 2.2     CBC WITH AUTOMATED DIFF    Collection Time: 09/21/20 12:59 PM   Result Value Ref Range    WBC 6.9 3.6 - 11.0 K/uL    RBC 3.62 (L) 3.80 - 5.20 M/uL    HGB 11.1 (L) 11.5 - 16.0 g/dL    HCT 34.4 (L) 35.0 - 47.0 %    MCV 95.0 80.0 - 99.0 FL    MCH 30.7 26.0 - 34.0 PG    MCHC 32.3 30.0 - 36.5 g/dL    RDW 13.2 11.5 - 14.5 %    PLATELET 294 734 - 024 K/uL    MPV 9.4 8.9 - 12.9 FL    NRBC 0.0 0  WBC    ABSOLUTE NRBC 0.00 0.00 - 0.01 K/uL    NEUTROPHILS 78 (H) 32 - 75 %    LYMPHOCYTES 11 (L) 12 - 49 %    MONOCYTES 10 5 - 13 %    EOSINOPHILS 1 0 - 7 %    BASOPHILS 0 0 - 1 %    IMMATURE GRANULOCYTES 0 0.0 - 0.5 %    ABS. NEUTROPHILS 5.3 1.8 - 8.0 K/UL    ABS. LYMPHOCYTES 0.8 0.8 - 3.5 K/UL    ABS. MONOCYTES 0.7 0.0 - 1.0 K/UL    ABS. EOSINOPHILS 0.1 0.0 - 0.4 K/UL    ABS. BASOPHILS 0.0 0.0 - 0.1 K/UL    ABS. IMM.  GRANS. 0.0 0.00 - 0.04 K/UL    DF SMEAR SCANNED RBC COMMENTS NORMOCYTIC, NORMOCHROMIC     PROTHROMBIN TIME + INR    Collection Time: 09/21/20 12:59 PM   Result Value Ref Range    INR 1.0 0.9 - 1.1      Prothrombin time 10.4 9.0 - 11.1 sec   SAMPLES BEING HELD    Collection Time: 09/21/20 12:59 PM   Result Value Ref Range    SAMPLES BEING HELD 1RD,1SST     COMMENT        Add-on orders for these samples will be processed based on acceptable specimen integrity and analyte stability, which may vary by analyte.    FERRITIN    Collection Time: 09/21/20 12:59 PM   Result Value Ref Range    Ferritin 93 8 - 252 NG/ML   FOLATE    Collection Time: 09/21/20 12:59 PM   Result Value Ref Range    Folate 18.6 5.0 - 21.0 ng/mL   LD    Collection Time: 09/21/20 12:59 PM   Result Value Ref Range     81 - 246 U/L   RETICULOCYTE COUNT    Collection Time: 09/21/20 12:59 PM   Result Value Ref Range    Reticulocyte count 1.2 0.7 - 2.1 %    Absolute Retic Cnt. 0.0431 0.0164 - 0.0776 M/ul   TSH 3RD GENERATION    Collection Time: 09/21/20 12:59 PM   Result Value Ref Range    TSH 1.43 0.36 - 3.74 uIU/mL   VITAMIN B12    Collection Time: 09/21/20 12:59 PM   Result Value Ref Range    Vitamin B12 562 193 - 986 pg/mL   URINALYSIS W/MICROSCOPIC    Collection Time: 09/21/20  5:11 PM   Result Value Ref Range    Color YELLOW/STRAW      Appearance CLEAR CLEAR      Specific gravity 1.022 1.003 - 1.030      pH (UA) 7.0 5.0 - 8.0      Protein Negative NEG mg/dL    Glucose Negative NEG mg/dL    Ketone TRACE (A) NEG mg/dL    Bilirubin Negative NEG      Blood Negative NEG      Urobilinogen 1.0 0.2 - 1.0 EU/dL    Nitrites Negative NEG      Leukocyte Esterase MODERATE (A) NEG      WBC 5-10 0 - 4 /hpf    RBC 0-5 0 - 5 /hpf    Epithelial cells FEW FEW /lpf    Bacteria Negative NEG /hpf    Hyaline cast 2-5 0 - 5 /lpf   URINE CULTURE HOLD SAMPLE    Collection Time: 09/21/20  5:11 PM    Specimen: Serum; Urine   Result Value Ref Range    Urine culture hold        Urine on hold in Microbiology dept for 2 days.  If unpreserved urine is submitted, it cannot be used for addtional testing after 24 hours, recollection will be required. DRUG SCREEN, URINE    Collection Time: 09/21/20  6:10 PM   Result Value Ref Range    AMPHETAMINES Negative NEG      BARBITURATES Negative NEG      BENZODIAZEPINES Negative NEG      COCAINE Negative NEG      METHADONE Negative NEG      OPIATES Negative NEG      PCP(PHENCYCLIDINE) Negative NEG      THC (TH-CANNABINOL) Negative NEG      Drug screen comment (NOTE)    SARS-COV-2    Collection Time: 09/21/20  6:10 PM   Result Value Ref Range    Specimen source Nasopharyngeal      SARS-CoV-2 PENDING     SARS-CoV-2 PENDING     Specimen source Nasopharyngeal      COVID-19 rapid test PENDING     Specimen type NP Swab      Health status PENDING     COVID-19 PENDING        Medications reviewed  Current Facility-Administered Medications   Medication Dose Route Frequency    aspirin chewable tablet 81 mg  81 mg Oral DAILY    atorvastatin (LIPITOR) tablet 20 mg  20 mg Oral QHS    acetaminophen (TYLENOL) tablet 650 mg  650 mg Oral Q6H PRN    Or    acetaminophen (TYLENOL) suppository 650 mg  650 mg Rectal Q6H PRN    polyethylene glycol (MIRALAX) packet 17 g  17 g Oral DAILY PRN    ondansetron (ZOFRAN ODT) tablet 4 mg  4 mg Oral Q8H PRN    Or    ondansetron (ZOFRAN) injection 4 mg  4 mg IntraVENous Q6H PRN    famotidine (PEPCID) tablet 20 mg  20 mg Oral BID    enoxaparin (LOVENOX) injection 40 mg  40 mg SubCUTAneous DAILY    dextrose 5% - 0.45% NaCl with KCl 20 mEq/L infusion  125 mL/hr IntraVENous CONTINUOUS     Care Plan discussed with: Patient and Nurse  Total time spent with patient: 30 minutes.   Fam Lamar MD

## 2020-09-22 NOTE — PROGRESS NOTES
Bedside and Verbal shift change report given to JACKIE Dong (oncoming nurse) by Thierno Agarwal RN (offgoing nurse). Report included the following information SBAR, Kardex, MAR and Accordion.

## 2020-09-23 LAB
COVID-19, XGCOVT: NOT DETECTED
EST. AVERAGE GLUCOSE BLD GHB EST-MCNC: 114 MG/DL
HBA1C MFR BLD: 5.6 % (ref 4–5.6)
HEALTH STATUS, XMCV2T: NORMAL
SOURCE, COVRS: NORMAL
SPECIMEN SOURCE, FCOV2M: NORMAL
SPECIMEN TYPE, XMCV1T: NORMAL

## 2020-09-23 PROCEDURE — 74011250637 HC RX REV CODE- 250/637: Performed by: FAMILY MEDICINE

## 2020-09-23 PROCEDURE — 74011250636 HC RX REV CODE- 250/636: Performed by: FAMILY MEDICINE

## 2020-09-23 PROCEDURE — 96372 THER/PROPH/DIAG INJ SC/IM: CPT

## 2020-09-23 PROCEDURE — 74011250637 HC RX REV CODE- 250/637: Performed by: INTERNAL MEDICINE

## 2020-09-23 PROCEDURE — 74011250636 HC RX REV CODE- 250/636: Performed by: PHYSICIAN ASSISTANT

## 2020-09-23 PROCEDURE — 87635 SARS-COV-2 COVID-19 AMP PRB: CPT

## 2020-09-23 PROCEDURE — 74011000250 HC RX REV CODE- 250: Performed by: PHYSICIAN ASSISTANT

## 2020-09-23 PROCEDURE — 97535 SELF CARE MNGMENT TRAINING: CPT

## 2020-09-23 PROCEDURE — 77030038269 HC DRN EXT URIN PURWCK BARD -A

## 2020-09-23 PROCEDURE — 74011250636 HC RX REV CODE- 250/636: Performed by: INTERNAL MEDICINE

## 2020-09-23 PROCEDURE — 97530 THERAPEUTIC ACTIVITIES: CPT

## 2020-09-23 PROCEDURE — 3E0U3BZ INTRODUCTION OF ANESTHETIC AGENT INTO JOINTS, PERCUTANEOUS APPROACH: ICD-10-PCS | Performed by: PHYSICIAN ASSISTANT

## 2020-09-23 PROCEDURE — 3E0U33Z INTRODUCTION OF ANTI-INFLAMMATORY INTO JOINTS, PERCUTANEOUS APPROACH: ICD-10-PCS | Performed by: PHYSICIAN ASSISTANT

## 2020-09-23 PROCEDURE — 99218 HC RM OBSERVATION: CPT

## 2020-09-23 RX ORDER — FAMOTIDINE 20 MG/1
20 TABLET, FILM COATED ORAL DAILY
Status: DISCONTINUED | OUTPATIENT
Start: 2020-09-24 | End: 2020-09-25 | Stop reason: HOSPADM

## 2020-09-23 RX ORDER — LIDOCAINE HYDROCHLORIDE 10 MG/ML
2 INJECTION INFILTRATION; PERINEURAL ONCE
Status: COMPLETED | OUTPATIENT
Start: 2020-09-23 | End: 2020-09-23

## 2020-09-23 RX ORDER — SODIUM CHLORIDE 9 MG/ML
75 INJECTION, SOLUTION INTRAVENOUS CONTINUOUS
Status: DISCONTINUED | OUTPATIENT
Start: 2020-09-23 | End: 2020-09-25 | Stop reason: HOSPADM

## 2020-09-23 RX ORDER — TRAMADOL HYDROCHLORIDE 50 MG/1
50 TABLET ORAL
Status: DISCONTINUED | OUTPATIENT
Start: 2020-09-23 | End: 2020-09-25 | Stop reason: HOSPADM

## 2020-09-23 RX ADMIN — ATORVASTATIN CALCIUM 20 MG: 20 TABLET, FILM COATED ORAL at 22:25

## 2020-09-23 RX ADMIN — BUPIVACAINE HYDROCHLORIDE 10 MG: 5 INJECTION, SOLUTION EPIDURAL; INTRACAUDAL at 11:32

## 2020-09-23 RX ADMIN — TRAMADOL HYDROCHLORIDE 50 MG: 50 TABLET, FILM COATED ORAL at 11:39

## 2020-09-23 RX ADMIN — TRIAMCINOLONE ACETONIDE 40 MG: 40 INJECTION, SUSPENSION INTRA-ARTICULAR; INTRAMUSCULAR at 11:32

## 2020-09-23 RX ADMIN — ENOXAPARIN SODIUM 40 MG: 40 INJECTION SUBCUTANEOUS at 08:05

## 2020-09-23 RX ADMIN — ASPIRIN 81 MG 81 MG: 81 TABLET ORAL at 08:01

## 2020-09-23 RX ADMIN — ACETAMINOPHEN 650 MG: 325 TABLET ORAL at 02:59

## 2020-09-23 RX ADMIN — SODIUM CHLORIDE 75 ML/HR: 900 INJECTION, SOLUTION INTRAVENOUS at 08:05

## 2020-09-23 RX ADMIN — FAMOTIDINE 20 MG: 20 TABLET, FILM COATED ORAL at 08:01

## 2020-09-23 RX ADMIN — LOSARTAN POTASSIUM 25 MG: 50 TABLET, FILM COATED ORAL at 08:01

## 2020-09-23 RX ADMIN — LIDOCAINE HYDROCHLORIDE 2 ML: 10 INJECTION, SOLUTION INFILTRATION; PERINEURAL at 11:32

## 2020-09-23 RX ADMIN — POTASSIUM CHLORIDE, DEXTROSE MONOHYDRATE AND SODIUM CHLORIDE 125 ML/HR: 150; 5; 450 INJECTION, SOLUTION INTRAVENOUS at 00:35

## 2020-09-23 NOTE — PROGRESS NOTES
Bedside and Verbal shift change report given to Sherin Snellen, RN (oncoming nurse) by Kelly Madrid RN (offgoing nurse). Report included the following information SBAR, Kardex, MAR and Accordion.

## 2020-09-23 NOTE — PROGRESS NOTES
Bedside shift change report given to Trevon Hernandez RN (oncoming nurse) by Coretta Velásquez RN (offgoing nurse). Report included the following information SBAR, Kardex and MAR.

## 2020-09-23 NOTE — PROGRESS NOTES
Problem: Self Care Deficits Care Plan (Adult)  Goal: *Acute Goals and Plan of Care (Insert Text)  Description: FUNCTIONAL STATUS PRIOR TO ADMISSION: Patient was modified independent using a rollator for functional mobility. Pt required assistance for ADLs, mostly for bathing and dressing. Pt reports needing increased assistance with UB bathing/dressing due to impaired ROM. HOME SUPPORT PRIOR TO ADMISSION: The patient lived alone with daily morning care aide x 2 hrs to provide assistance with bathing and dressing. Occupational Therapy Goals  Initiated 9/22/2020  1. Patient will perform static standing with CGA for 1 minute in preparation for OOB ADLs within 7 day(s). 2.  Patient will perform upper body dressing with moderate assistance within 7 day(s). 3.  Patient will perform lower body dressing and bathing with minimal assistance within 7 day(s). 4.  Patient will perform toilet transfers with minimal assistance/contact guard assist within 7 day(s). 5.  Patient will perform all aspects of toileting with minimal assistance/contact guard assist within 7 day(s). 6.  Patient will participate in upper extremity therapeutic exercise/activities with supervision/set-up for 3 minutes within 7 day(s). 7.  Patient will utilize energy conservation techniques during functional activities with verbal cues within 7 day(s). Outcome: Progressing Towards Goal     OCCUPATIONAL THERAPY TREATMENT  Patient: Rhiannon Manzo (38 y.o. female)  Date: 9/23/2020  Diagnosis: Weakness [R53.1]   <principal problem not specified>       Precautions: Bed Alarm, Fall  Chart, occupational therapy assessment, plan of care, and goals were reviewed. ASSESSMENT  Patient continues with skilled OT services and is progressing towards goals.   Pt with improvement in ROM and pain in L UE this session after receiving injection to L shoulder by ortho this AM. She continues to demonstrate slight L lateral lean in sitting, reporting this feels midline. She requires max A to don brief at EOB but does attempt to assist with pulling up brief in front in standing. Pt performs SPT to chair with max A x 2 using rollator, however continues to have very flexed posture and demonstrates minimal use of UEs for support. Once in chair, pt performs LB bathing tasks with increased participation and initiation as compared to yesterday. She is set up for oral hygiene and requires assist to manage opening small ADL tubes. Recommend Lift Team assist back to bed. Continue to recommend SNF-level rehab at discharge. Current Level of Function Impacting Discharge (ADLs): max A LB ADLs; mod to max A LB bathing; set up to min A grooming; max A x 2 for SPT    Other factors to consider for discharge: high fall risk; improved L UE ROM/pain following injection         PLAN :  Patient continues to benefit from skilled intervention to address the above impairments. Continue treatment per established plan of care. to address goals. Recommend with staff: lift team for assistance back to bed; encourage participation in UB ADLs with assistance    Recommend next OT session: continue with gentle AROM to UEs; foam for  strengthening; standing tolerance; ADL transfers    Recommendation for discharge: (in order for the patient to meet his/her long term goals)  Therapy up to 5 days/week in SNF setting    This discharge recommendation:  Has been made in collaboration with the attending provider and/or case management    IF patient discharges home will need the following DME: DINORA PIMENTEL, B[facility]       SUBJECTIVE:   Patient stated My arm feels better now.     OBJECTIVE DATA SUMMARY:   Cognitive/Behavioral Status:  Neurologic State: Alert  Orientation Level: Oriented X4  Cognition: Follows commands  Perception: Cues to maintain midline in sitting; Tactile;Verbal;Visual  Perseveration: No perseveration noted  Safety/Judgement: Decreased awareness of environment; Fall prevention; Insight into deficits    Functional Mobility and Transfers for ADLs:  Bed Mobility:  Supine to Sit: Maximum assistance; Additional time  Sit to Supine: (Pt up in chair at end of session)  Scooting: Moderate assistance;Maximum assistance; Additional time    Transfers:  Sit to Stand: Maximum assistance;Assist x2; Additional time  Bed to Chair: Maximum assistance;Assist x2; Additional time(near constant cueing; A for rollator mgmt and stability)    Balance:  Sitting: Impaired; Without support(lateral lean to L)  Sitting - Static: Good (unsupported); Fair (occasional)  Sitting - Dynamic: Fair (occasional)  Standing: Impaired; With support(with rollator)  Standing - Static: Constant support;Fair;Poor  Standing - Dynamic : Constant support;Poor    ADL Intervention:  Grooming  Grooming Assistance: Set-up; Minimum assistance  Position Performed: Seated in chair  Brushing Teeth: Set-up; Minimum assistance(min A to manage opening toothpaste and placing on toothbrush)  Cues: Physical assistance;Verbal cues provided    Lower Body Bathing  Lower Body : Moderate assistance(pt able to wash upper thighs to knees and partial lower LEs)  Position Performed: Seated in chair  Cues: Physical assistance; Tactile cues provided;Verbal cues provided;Visual cues provided    Lower Body Dressing Assistance  Protective Undergarmet: Maximum assistance(simulated as underwear; A for distal threading and in back)  Socks: Total assistance (dependent)  Leg Crossed Method Used: No  Position Performed: Seated edge of bed  Cues: Don;Doff;Physical assistance; Tactile cues provided;Verbal cues provided;Visual cues provided    Cognitive Retraining  Safety/Judgement: Decreased awareness of environment; Fall prevention; Insight into deficits    Therapeutic Exercises:   Pt performs reaching tasks with bilateral UEs while seated in chair. Noted improvement in pain and ROM in L UE and pt able to reach across midline and almost fully across to R shoulder.  She requires proximal support at L elbow to reach out and above ~45*. Pain:  Pt reporting no pain in L shoulder; received injection from ortho this AM    Activity Tolerance:   Fair  Please refer to the flowsheet for vital signs taken during this treatment. After treatment patient left in no apparent distress:   Sitting in chair, Call bell within reach, Bed / chair alarm activated, Caregiver / family present and RN notified    COMMUNICATION/COLLABORATION:   The patients plan of care was discussed with: Physical therapist and Registered nurse.      Azael Torres OT  Time Calculation: 39 mins

## 2020-09-23 NOTE — PROGRESS NOTES
[facility] Dosing Services: 9/23/20    Pharmacist Renal Dosing Progress Note for famotidine   Physician Dr. Chung Johnson    The following medication: famotidine was automatically dose-adjusted per [facility] P&T Committee Protocol, with respect to renal function. Consult provided for this   80 y.o. , female , for the indication of GERD. Pt Weight:   Wt Readings from Last 1 Encounters:   09/22/20 60.3 kg (133 lb)         Previous Regimen Famotidine 20mg BID   Serum Creatinine Lab Results   Component Value Date/Time    Creatinine 0.94 09/22/2020 06:58 AM    Creatinine (POC) 0.8 07/14/2014 01:18 PM       Creatinine Clearance Estimated Creatinine Clearance: 33.8 mL/min (based on SCr of 0.94 mg/dL). BUN Lab Results   Component Value Date/Time    BUN 25 (H) 09/22/2020 06:58 AM       Dosage changed to:  famotidine 20mg daily for CrCl <50mL/min    Additional notes:      Pharmacy to continue to monitor patient daily. Will make dosage adjustments based upon changing renal function. Signed Stacia Zee.  Contact information:  [phone]

## 2020-09-23 NOTE — PROGRESS NOTES
Problem: Mobility Impaired (Adult and Pediatric)  Goal: *Acute Goals and Plan of Care (Insert Text)  Description: FUNCTIONAL STATUS PRIOR TO ADMISSION: Patient was modified independent using a rollator for functional mobility. HOME SUPPORT PRIOR TO ADMISSION: The patient lived alone with daily morning care aide x 2 hrs to provide assistance with bathing and dressing. Physical Therapy Goals  Initiated 9/22/2020  1. Patient will move from supine to sit and sit to supine , scoot up and down, and roll side to side in bed with minimal assistance/contact guard assist within 7 day(s). 2.  Patient will transfer from bed to chair and chair to bed with minimal assistance/contact guard assist using the least restrictive device within 7 day(s). 3.  Patient will perform sit to stand with minimal assistance/contact guard assist within 7 day(s). 4.  Patient will ambulate with minimal assistance/contact guard assist for 25 feet with the least restrictive device within 7 day(s). Note:   PHYSICAL THERAPY TREATMENT  Patient: Zachery Guzman (37 y.o. female)  Date: 9/23/2020  Diagnosis: Weakness [R53.1]   <principal problem not specified>       Precautions: Bed Alarm, Fall  Chart, physical therapy assessment, plan of care and goals were reviewed. ASSESSMENT  Patient continues with skilled PT services and is progressing towards goals. Patient's son reports less pain in left shoulder following injection per ortho as well as after receiving dose of tramadol. RN cleared patient to attempt tx with therapies and patient agreeable. Patient needing overall max x 1 for bed mobility with extensive amount of time and the bed modified as well as max x2 persons for transfers. Patient with 90 degrees trunk flexion and unable to recruit UE strength to erect posture. Up to chair and incontinent of urine but therapist able to don a brief to help catch majority of stress incontinence.  Next session should try and don a brief in supine before supine>sit transition. Informed RN and Lift team patient will need max x 2 of lift team for back to bed. Bolstered patient's left side trunk with pillows. Discussed with son that patient is improved today but agree she is far from her status of 7 days ago. Recommend SNF . 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 Continue treatment per established plan of care. to address goals. Recommendation for discharge: (in order for the patient to meet his/her long term goals)  Therapy up to 5 days/week in SNF setting    This discharge recommendation:  Has not yet been discussed the attending provider and/or case management    IF patient discharges home will need the following DME: none       SUBJECTIVE:   Patient stated Michael Gobble will try but I am worried about peeing.     OBJECTIVE DATA SUMMARY:   Critical Behavior:  Neurologic State: Alert  Orientation Level: Oriented X4  Cognition: Follows commands  Safety/Judgement: Decreased awareness of environment, Fall prevention, Insight into deficits  Functional Mobility Training:  Bed Mobility:     Supine to Sit: Maximum assistance; Additional time  Sit to Supine: (Pt up in chair at end of session)  Scooting: Moderate assistance;Maximum assistance; Additional time        Transfers:  Sit to Stand: Maximum assistance;Assist x2; Additional time  Stand to Sit: Moderate assistance; Additional time        Bed to Chair: Maximum assistance;Assist x2; Additional time(near constant cueing; A for rollator mgmt and stability)                    Balance:  Sitting: Impaired; Without support(lateral lean to L)  Sitting - Static: Good (unsupported); Fair (occasional)  Sitting - Dynamic: Fair (occasional)  Standing: Impaired; With support(with rollator)  Standing - Static: Constant support;Fair;Poor  Standing - Dynamic : Constant support;Poor  Ambulation/Gait Training:  Able to make 6 very small advances of left foot forward and pivot during transfer bed >chair with rollator. Poor foot clearance and each step requires increased time, encouragement and max x 2      Pain Rating:  Diffuse pain with any and all movement , not rated. Pain nedra left knee and shoulder    Activity Tolerance:   Poor, SpO2 stable on RA, requires frequent rest breaks, and observed SOB with activity  Please refer to the flowsheet for vital signs taken during this treatment. After treatment patient left in no apparent distress:   Sitting in chair, Call bell within reach, Bed / chair alarm activated, and Caregiver / family present    COMMUNICATION/COLLABORATION:   The patients plan of care was discussed with: Occupational therapist and Registered nurse.      Shay Jack, PT, DPT   Time Calculation: 27 mins

## 2020-09-23 NOTE — PROGRESS NOTES
Daily Progress Note: 9/23/2020  Maria G Terry MD    Assessment/Plan:   Weakness - POA, likely due to dehydration. Admit to remote tele. Neuro checks and neurology consult. Fall precautions and PT/OT/speech evaluation. lipid panel with excellent LDL. MRI brain ok, ECHO ok. Continue ASA and statin     Acute metabolic encephalopathy - POA, mild. Likely due to dehydration in setting of early dementia, ultram may contribute. Hydrate. Outpatient neuropsych testing. Hold ultram     Dehydration / Hypokalemia - POA due to poor PO intake. Hydrate and repleted K. Hold HCTZ for now      Hypotension / HTN (hypertension) - POA, stop HCTZ for now and see if she feels better, start Lower dose ARB or ARB will be added - permissive HTN in 79 yo woman.     Anemia - POA, mild, but will worsen with hydration. Check serologies.     High cholesterol - Check panel. Continue simvastatin. Severe arthritis left shoulder  - Ortho consulted    Hx Renal cancer - Outpatient follow up.        Problem List:  Problem List as of 9/23/2020 Date Reviewed: 9/21/2020          Codes Class Noted - Resolved    Anemia ICD-10-CM: D64.9  ICD-9-CM: 285.9  9/21/2020 - Present        Hypokalemia ICD-10-CM: E87.6  ICD-9-CM: 276.8  9/21/2020 - Present        Weakness ICD-10-CM: R53.1  ICD-9-CM: 780.79  9/21/2020 - Present        Acute metabolic encephalopathy MYT-83-WG: G93.41  ICD-9-CM: 348.31  9/21/2020 - Present        Hypotension ICD-10-CM: I95.9  ICD-9-CM: 458.9  9/21/2020 - Present        Dehydration ICD-10-CM: E86.0  ICD-9-CM: 276.51  9/21/2020 - Present        Renal cancer (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  She is s/p a right robotic partial nephrectomy 3/20/17 for a t1a clear cell renal carcinoma             HTN (hypertension) (Chronic) ICD-10-CM: I10  ICD-9-CM: 401.9  3/30/2018 - Present        High cholesterol (Chronic) ICD-10-CM: E78.00  ICD-9-CM: 272.0  3/30/2018 - Present        RESOLVED: Renal cancer (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 She is s/p a right robotic partial nephrectomy 3/20/17 for a t1a clear cell renal carcinoma             RESOLVED: Urinary retention ICD-10-CM: R33.9  ICD-9-CM: 788.20  3/31/2018 - 9/21/2020        RESOLVED: Acute arthritis ICD-10-CM: M19.90  ICD-9-CM: 716.90  3/31/2018 - 9/21/2020        RESOLVED: Elevated serum creatinine ICD-10-CM: R79.89  ICD-9-CM: 790.99  3/30/2018 - 9/21/2020        RESOLVED: Hyperglycemia ICD-10-CM: R73.9  ICD-9-CM: 790.29  3/30/2018 - 9/21/2020              Subjective:     80 y.o.  female who presented to the Emergency Department complaining of weakness. Noted to worsen over a week, with perhaps left weakness greater than right. Per patient this is due to severe R shoulder pain. Also more confused, but better in ER now. She reports not eating and drinking well due to pain, and taking extra Ultram. ER finds dehydration and normal head imaging. We will admit her for obeservation. (Dr Kirsten Dc). 9/22: No complaints this AM except \"feel weak. \"  She is not sure why she is here. She may need inpt rehab.      9/23:  No complaint except left shoulder pain and usual back pain. ECHO basically ok. Neuro and Ortho have seen and Ortho plans IA injection for left shoulder. Will resume tramadol for her shoulder pain. Discussed with pts son and inpt rehab planned.   He wants us to consider a different NSAID as there may not be cross reactivity with NSAIDS of different classes - will leave decision up to Ortho.       Review of Systems:   A comprehensive review of systems was negative except for that written in the HPI. Objective:   Physical Exam:     Visit Vitals  BP (!) 168/89 (BP 1 Location: Right arm, BP Patient Position: Post activity) Comment: Notified RN   Pulse 84   Temp 98.1 °F (36.7 °C)   Resp 17   Ht 5' 1\" (1.549 m)   Wt 60.3 kg (133 lb)   SpO2 96%   BMI 25.13 kg/m²      O2 Device: Room air  Temp (24hrs), Av.2 °F (36.8 °C), Min:97.6 °F (36.4 °C), Max:98.7 °F (37.1 °C)    No intake/output data recorded.  1901 -  0700  In: John Duncan [P.O.:800; I.V.:1085]  Out: 1800 [Urine:1800]  General:  Alert, cooperative, frail appearing, no distress, appears stated age. Head:  Normocephalic, without obvious abnormality, atraumatic. Eyes:  Conjunctivae/corneas clear. PERRL, EOMs intact. Neck: Supple, symmetrical, trachea midline, no adenopathy, thyroid: no enlargement/tenderness/nodules, no carotid bruit and no JVD. Lungs:   Clear to auscultation bilaterally. Chest wall:  No tenderness or deformity. Heart:  Regular rate and rhythm, S1, S2 normal, no murmur, click, rub or gallop. Abdomen:   Soft, non-tender. Bowel sounds normal. No masses,  No organomegaly. Extremities: no cyanosis or edema. No calf tenderness or cords. Shoulder unchanged - painful with any movement. Pulses: 2+ and symmetric all extremities. Skin: Skin color, texture, turgor normal. No rashes   Neurologic:   Alert and oriented X 3 and more oriented today. Knows she is in [address] [place] [address] and day/date/month at this time.  equally weak. No cogwheeling or rigidity. Gait not tested at this time. Sensation grossly normal to touch. Gross motor of extremities normal.       Data Review:   EXAM: MRI BRAIN WO CONT  INDICATION: tia  COMPARISON: 2014  CONTRAST: None. FINDINGS:  Diffusion imaging does not show acute ischemic changes. There is no extra-axial fluid collection, hemorrhage or shift. Flow voids in major vessels at the base of the brain are present. There is no mass. There is moderate atrophy.   There is mild nonspecific white matter changes. IMPRESSION:  1. No acute findings no mass. 2. Atrophy and white matter disease. ECHO 9/22/20  Interpretation Summary   Result status: Final result    · LV: Estimated LVEF is 55 - 60%. Normal systolic function (ejection fraction normal). Small left ventricle. Mild concentric hypertrophy. Wall motion: normal. Left ventricular diastolic dysfunction. · LA: Mildly dilated left atrium. · AV: Mild aortic valve regurgitation is present. · MV: Mitral valve non-specific thickening. · PV: Moderate pulmonic valve regurgitation is present. · No right to left shunting       Recent Days:  Recent Labs     09/22/20  0658 09/21/20  1259   WBC 6.2 6.9   HGB 11.8 11.1*   HCT 37.2 34.4*    218     Recent Labs     09/22/20  0658 09/21/20  1259    136   K 5.3* 3.4*    102   CO2 25 30   * 97   BUN 25* 38*   CREA 0.94 0.90   CA 10.8* 11.0*   MG 2.0  --    ALB 3.2* 3.4*   TBILI 1.1* 1.0   ALT 32 32   INR  --  1.0     No results for input(s): PH, PCO2, PO2, HCO3, FIO2 in the last 72 hours.     24 Hour Results:  Recent Results (from the past 24 hour(s))   ECHO ADULT COMPLETE    Collection Time: 09/22/20  2:05 PM   Result Value Ref Range    IVSd 1.08 (A) 0.6 - 0.9 cm    LVIDd 3.84 (A) 3.9 - 5.3 cm    LVIDs 3.20 cm    LVOT d 1.98 cm    LVPWd 1.02 (A) 0.6 - 0.9 cm    LVOT Peak Gradient 6.04 mmHg    LVOT Peak Gradient 9.15 mmHg    Left Ventricular Outflow Tract Mean Gradient 3.39 mmHg    LVOT SV 67.9 mL    LVOT Peak Velocity 122.84 cm/s    LVOT Peak Velocity 151.28 cm/s    LVOT VTI 22.00 cm    RVIDd 4.86 cm    Left Atrium Major Axis 2.89 cm    LA Volume 57.69 22 - 52 mL    LA Vol 2C 45.57 22 - 52 mL    LA Vol 4C 50.89 22 - 52 mL    Aortic Valve Area by Continuity of Peak Velocity 2.10 cm2    Aortic Valve Area by Continuity of Peak Velocity 2.59 cm2    Aortic Valve Area by Continuity of Peak Velocity 2.43 cm2    Aortic Valve Area by Continuity of Peak Velocity 1.97 cm2 Aortic Valve Area by Continuity of VTI 2.11 cm2    AoV PG 79.41 mmHg    Aortic Regurgitant Pressure Half-time 0.53 s    AR Max Jose Armando 445.29 cm/s    AoV PG 13.06 mmHg    AoV PG 14.78 mmHg    Aortic Valve Systolic Mean Gradient 1.99 mmHg    Aortic Valve Systolic Peak Velocity 739.30 cm/s    Aortic Valve Systolic Peak Velocity 784.09 cm/s    Aortic valve mean velocity 124.00 cm/s    AoV VTI 32.18 cm    MV A Jose Armando 77.26 cm/s    Mitral Valve E Wave Deceleration Time 0.18 s    MV E Jose Armando 82.50 cm/s    MV E/A 1.07     Pulmonic Valve Systolic Peak Instantaneous Gradient 4.59 mmHg    Pulmonic Regurgitant End Max Velocity 106.30 cm/s    Pulmonic Valve Systolic Peak Instantaneous Gradient 6.23 mmHg    Triscuspid Valve Regurgitation Peak Gradient 23.86 mmHg    Triscuspid Valve Regurgitation Peak Gradient 23.86 mmHg    Triscuspid Valve Regurgitation Peak Gradient 18.88 mmHg    TR Max Velocity 244.25 cm/s    Ao Root D 3.10 cm    LV Mass .9 67 - 162 g    LV Mass AL Index 80.5 43 - 95 g/m2    Left Atrium Minor Axis 1.82 cm    LA Vol Index 36.32 16 - 28 ml/m2    LA Vol Index 28.69 16 - 28 ml/m2    LA Vol Index 32.04 16 - 28 ml/m2    TOM/BSA VTI 1.3 cm2/m2       Medications reviewed  Current Facility-Administered Medications   Medication Dose Route Frequency    losartan (COZAAR) tablet 25 mg  25 mg Oral DAILY    lidocaine (XYLOCAINE) 10 mg/mL (1 %) injection 2 mL  2 mL SubCUTAneous ONCE    bupivacaine (PF) (MARCAINE) 0.5 % (5 mg/mL) injection 10 mg  2 mL Intra artICUlar ONCE    triamcinolone acetonide (KENALOG-40) 40 mg/mL injection 40 mg  40 mg Intra artICUlar ONCE    aspirin chewable tablet 81 mg  81 mg Oral DAILY    atorvastatin (LIPITOR) tablet 20 mg  20 mg Oral QHS    acetaminophen (TYLENOL) tablet 650 mg  650 mg Oral Q6H PRN    Or    acetaminophen (TYLENOL) suppository 650 mg  650 mg Rectal Q6H PRN    polyethylene glycol (MIRALAX) packet 17 g  17 g Oral DAILY PRN    ondansetron (ZOFRAN ODT) tablet 4 mg  4 mg Oral Q8H PRN    Or    ondansetron (ZOFRAN) injection 4 mg  4 mg IntraVENous Q6H PRN    famotidine (PEPCID) tablet 20 mg  20 mg Oral BID    enoxaparin (LOVENOX) injection 40 mg  40 mg SubCUTAneous DAILY    dextrose 5% - 0.45% NaCl with KCl 20 mEq/L infusion  125 mL/hr IntraVENous CONTINUOUS     Care Plan discussed with: Patient and Nurse  Total time spent with patient: 30 minutes.   Eduarda Razo MD

## 2020-09-23 NOTE — PROCEDURES
After verbal consent was obtained, I injected the left shoulder from an anterior approach with a 22 gauge needle. I injected 2 cc of 1% lidocaine, 2 cc of .5% Marcaine and 1 cc of 40 mg of Kenalog. Patient tolerated procedure well without adverse affect. Patient to begin ROM as tolerated today. PT today. OOB and ambulate as tolerated.        Ella Hidalgo PA-C  [facility]  [address] [facility]

## 2020-09-23 NOTE — PROGRESS NOTES
Transition Plan of Care  RUR OBS      Plan:  1. Continue to monitor patient's response to treatment. 2. Patient is under OBS, I called Abbi and REGINALD asking about if they are waiving the 3 day for OBS. Waiting to hear back. 3. [facility] -[phone] and he would like to proceed with [phone] [facility] rehab if we can. 4. Rehab referral will be needed by doctor. 5. Case Management to follow.     Juan Manuel Klein BS

## 2020-09-23 NOTE — PROGRESS NOTES
See procedure note. Injected left GH joint today. Followup outpatient with Dr. Marcella Greer if injection is not efficacious.       Kamilah Major PA-C  [facility]  [address] [place] [address]

## 2020-09-23 NOTE — PROGRESS NOTES
Occupational Therapy:  09/23/20    Chart reviewed for OT intervention. Pt currently with Ortho PA and RN at bedside for injection of L shoulder. Will follow up.     Thank you,  Josefina Baez, OTR/ANDREINA

## 2020-09-24 ENCOUNTER — APPOINTMENT (OUTPATIENT)
Dept: GENERAL RADIOLOGY | Age: 85
DRG: 640 | End: 2020-09-24
Attending: FAMILY MEDICINE
Payer: MEDICARE

## 2020-09-24 ENCOUNTER — TELEPHONE (OUTPATIENT)
Dept: UROLOGY | Age: 85
End: 2020-09-24

## 2020-09-24 LAB
ALBUMIN SERPL-MCNC: 2.9 G/DL (ref 3.5–5)
ALBUMIN/GLOB SERPL: 0.8 {RATIO} (ref 1.1–2.2)
ALP SERPL-CCNC: 68 U/L (ref 45–117)
ALT SERPL-CCNC: 28 U/L (ref 12–78)
ANION GAP SERPL CALC-SCNC: 5 MMOL/L (ref 5–15)
AST SERPL-CCNC: 31 U/L (ref 15–37)
BASOPHILS # BLD: 0 K/UL (ref 0–0.1)
BASOPHILS NFR BLD: 1 % (ref 0–1)
BILIRUB SERPL-MCNC: 1 MG/DL (ref 0.2–1)
BUN SERPL-MCNC: 18 MG/DL (ref 6–20)
BUN/CREAT SERPL: 24 (ref 12–20)
CALCIUM SERPL-MCNC: 10.6 MG/DL (ref 8.5–10.1)
CHLORIDE SERPL-SCNC: 107 MMOL/L (ref 97–108)
CO2 SERPL-SCNC: 25 MMOL/L (ref 21–32)
CREAT SERPL-MCNC: 0.76 MG/DL (ref 0.55–1.02)
CRP SERPL-MCNC: <0.29 MG/DL (ref 0–0.6)
DIFFERENTIAL METHOD BLD: ABNORMAL
EOSINOPHIL # BLD: 0.3 K/UL (ref 0–0.4)
EOSINOPHIL NFR BLD: 6 % (ref 0–7)
ERYTHROCYTE [DISTWIDTH] IN BLOOD BY AUTOMATED COUNT: 13.2 % (ref 11.5–14.5)
ERYTHROCYTE [SEDIMENTATION RATE] IN BLOOD: 34 MM/HR (ref 0–30)
GLOBULIN SER CALC-MCNC: 3.7 G/DL (ref 2–4)
GLUCOSE SERPL-MCNC: 90 MG/DL (ref 65–100)
HCT VFR BLD AUTO: 36.6 % (ref 35–47)
HEALTH STATUS, XMCV2T: NORMAL
HGB BLD-MCNC: 11.5 G/DL (ref 11.5–16)
IMM GRANULOCYTES # BLD AUTO: 0 K/UL (ref 0–0.04)
IMM GRANULOCYTES NFR BLD AUTO: 0 % (ref 0–0.5)
LYMPHOCYTES # BLD: 1 K/UL (ref 0.8–3.5)
LYMPHOCYTES NFR BLD: 17 % (ref 12–49)
MCH RBC QN AUTO: 30.6 PG (ref 26–34)
MCHC RBC AUTO-ENTMCNC: 31.4 G/DL (ref 30–36.5)
MCV RBC AUTO: 97.3 FL (ref 80–99)
MONOCYTES # BLD: 0.5 K/UL (ref 0–1)
MONOCYTES NFR BLD: 9 % (ref 5–13)
NEUTS SEG # BLD: 3.9 K/UL (ref 1.8–8)
NEUTS SEG NFR BLD: 67 % (ref 32–75)
NRBC # BLD: 0 K/UL (ref 0–0.01)
NRBC BLD-RTO: 0 PER 100 WBC
PLATELET # BLD AUTO: 226 K/UL (ref 150–400)
PMV BLD AUTO: 9.2 FL (ref 8.9–12.9)
POTASSIUM SERPL-SCNC: 4.3 MMOL/L (ref 3.5–5.1)
PROT SERPL-MCNC: 6.6 G/DL (ref 6.4–8.2)
RBC # BLD AUTO: 3.76 M/UL (ref 3.8–5.2)
SARS-COV-2, COV2NT: NOT DETECTED
SODIUM SERPL-SCNC: 137 MMOL/L (ref 136–145)
SOURCE, COVRS: NORMAL
SPECIMEN SOURCE, FCOV2M: NORMAL
SPECIMEN TYPE, XMCV1T: NORMAL
WBC # BLD AUTO: 5.8 K/UL (ref 3.6–11)

## 2020-09-24 PROCEDURE — 80053 COMPREHEN METABOLIC PANEL: CPT

## 2020-09-24 PROCEDURE — 96372 THER/PROPH/DIAG INJ SC/IM: CPT

## 2020-09-24 PROCEDURE — 97530 THERAPEUTIC ACTIVITIES: CPT

## 2020-09-24 PROCEDURE — 2709999900 HC NON-CHARGEABLE SUPPLY

## 2020-09-24 PROCEDURE — 36415 COLL VENOUS BLD VENIPUNCTURE: CPT

## 2020-09-24 PROCEDURE — 74011250636 HC RX REV CODE- 250/636: Performed by: INTERNAL MEDICINE

## 2020-09-24 PROCEDURE — 85652 RBC SED RATE AUTOMATED: CPT

## 2020-09-24 PROCEDURE — 74011250636 HC RX REV CODE- 250/636: Performed by: FAMILY MEDICINE

## 2020-09-24 PROCEDURE — 74011250637 HC RX REV CODE- 250/637: Performed by: FAMILY MEDICINE

## 2020-09-24 PROCEDURE — 71045 X-RAY EXAM CHEST 1 VIEW: CPT

## 2020-09-24 PROCEDURE — 99218 HC RM OBSERVATION: CPT

## 2020-09-24 PROCEDURE — 85025 COMPLETE CBC W/AUTO DIFF WBC: CPT

## 2020-09-24 PROCEDURE — 74011250637 HC RX REV CODE- 250/637: Performed by: INTERNAL MEDICINE

## 2020-09-24 PROCEDURE — 97535 SELF CARE MNGMENT TRAINING: CPT

## 2020-09-24 PROCEDURE — 77030038269 HC DRN EXT URIN PURWCK BARD -A

## 2020-09-24 PROCEDURE — 86140 C-REACTIVE PROTEIN: CPT

## 2020-09-24 PROCEDURE — 65660000000 HC RM CCU STEPDOWN

## 2020-09-24 RX ADMIN — TRAMADOL HYDROCHLORIDE 50 MG: 50 TABLET, FILM COATED ORAL at 16:25

## 2020-09-24 RX ADMIN — SODIUM CHLORIDE 75 ML/HR: 900 INJECTION, SOLUTION INTRAVENOUS at 06:17

## 2020-09-24 RX ADMIN — FAMOTIDINE 20 MG: 20 TABLET, FILM COATED ORAL at 09:35

## 2020-09-24 RX ADMIN — TRAMADOL HYDROCHLORIDE 50 MG: 50 TABLET, FILM COATED ORAL at 09:48

## 2020-09-24 RX ADMIN — ASPIRIN 81 MG 81 MG: 81 TABLET ORAL at 09:35

## 2020-09-24 RX ADMIN — ATORVASTATIN CALCIUM 20 MG: 20 TABLET, FILM COATED ORAL at 21:39

## 2020-09-24 RX ADMIN — ENOXAPARIN SODIUM 40 MG: 40 INJECTION SUBCUTANEOUS at 09:35

## 2020-09-24 RX ADMIN — SODIUM CHLORIDE 75 ML/HR: 900 INJECTION, SOLUTION INTRAVENOUS at 18:48

## 2020-09-24 RX ADMIN — LOSARTAN POTASSIUM 25 MG: 50 TABLET, FILM COATED ORAL at 09:35

## 2020-09-24 NOTE — PROGRESS NOTES
Bedside and Verbal shift change report given to JACKIE Munoz (oncoming nurse) by Leeanna SANTOYO RN (offgoing nurse). Report included the following information SBAR, Kardex, Intake/Output, MAR and Recent Results.

## 2020-09-24 NOTE — TELEPHONE ENCOUNTER
pts son contacted the office, stated she was being seen at [facility] and being transferred to a skilled nursing facility. So one wanted to cancel her appointment. But her follow up next week was to go over her imaging results. He wants to make sure its not emergent that he does need to get her in right away or if it can wait till this issue is resolved first. Can we call him with the results ?

## 2020-09-24 NOTE — PROGRESS NOTES
Problem: Self Care Deficits Care Plan (Adult)  Goal: *Acute Goals and Plan of Care (Insert Text)  Description: FUNCTIONAL STATUS PRIOR TO ADMISSION: Patient was modified independent using a rollator for functional mobility. Pt required assistance for ADLs, mostly for bathing and dressing. Pt reports needing increased assistance with UB bathing/dressing due to impaired ROM. HOME SUPPORT PRIOR TO ADMISSION: The patient lived alone with daily morning care aide x 2 hrs to provide assistance with bathing and dressing. Occupational Therapy Goals  Initiated 9/22/2020  1. Patient will perform static standing with CGA for 1 minute in preparation for OOB ADLs within 7 day(s). 2.  Patient will perform upper body dressing with moderate assistance within 7 day(s). 3.  Patient will perform lower body dressing and bathing with minimal assistance within 7 day(s). 4.  Patient will perform toilet transfers with minimal assistance/contact guard assist within 7 day(s). 5.  Patient will perform all aspects of toileting with minimal assistance/contact guard assist within 7 day(s). 6.  Patient will participate in upper extremity therapeutic exercise/activities with supervision/set-up for 3 minutes within 7 day(s). 7.  Patient will utilize energy conservation techniques during functional activities with verbal cues within 7 day(s). Outcome: Progressing Towards Goal     OCCUPATIONAL THERAPY TREATMENT  Patient: Eneida Wong (48 y.o. female)  Date: 9/24/2020  Diagnosis: Weakness [R53.1]  Weakness [R53.1]   <principal problem not specified>       Precautions: Bed Alarm, Fall  Chart, occupational therapy assessment, plan of care, and goals were reviewed. ASSESSMENT  Patient continues with skilled OT services and is progressing towards goals. Patient is received seated up in chair following PT session, noted with continued L lateral lean, which pt reports as feeling centered.  Re-oriented pt to midline and she reports feeling as though she is leaning slightly to her right. Pt reporting some mild pain in L shoulder and participates in some AROM and grooming tasks while seated. Pt requires loosened caps to manage ADL containers and increased time during grooming. She is repositioned in chair to comfort and set up with lunch at end of session. Instructed student RN on how to assist with tray set-up to increase ease and independence with task. Continue to recommend SNF-level rehab at end of session. Current Level of Function Impacting Discharge (ADLs): Assist x 2 for mobility; set up to min A self feeding and grooming; max A LB ADLs; min to max A UB ADLs    Other factors to consider for discharge: high fall risk; L shoulder pain (s/p injection from ortho on 9/23); supportive family         PLAN :  Patient continues to benefit from skilled intervention to address the above impairments. Continue treatment per established plan of care. to address goals. Recommend with staff: lift team for assistance OOB or back to bed; set up for meals (assist with cutting food, opening containers, and set up tray for optimal reach); encourage ADLs as able. Recommend next OT session: upper body dressing/bathing    Recommendation for discharge: (in order for the patient to meet his/her long term goals)  Therapy up to 5 days/week in SNF setting    This discharge recommendation:  Has been made in collaboration with the attending provider and/or case management    IF patient discharges home will need the following DME: TBD       SUBJECTIVE:   Patient stated I will try.     OBJECTIVE DATA SUMMARY:   Cognitive/Behavioral Status:  Neurologic State: Alert  Orientation Level: Oriented X4  Cognition: Follows commands  Perception: Appears intact  Perseveration: No perseveration noted  Safety/Judgement: Decreased awareness of environment    Functional Mobility and Transfers for ADLs:  Bed Mobility:  Rolling: Assist x2;Maximum assistance  Supine to Sit: Additional time;Maximum assistance;Assist x2  Scooting: Maximum assistance    Transfers:  Sit to Stand: Assist x2;Maximum assistance; Additional time  Bed to Chair: Assist x2;Maximum assistance; Additional time    Balance:  Sitting: Impaired(lateral lean to L)  Standing: Impaired  Standing - Static: Constant support  Standing - Dynamic : Constant support    ADL Intervention:  Grooming  Grooming Assistance: Set-up; Minimum assistance  Position Performed: Seated in chair  Washing Face: Set-up  Brushing Teeth: Set-up; Minimum assistance(Pt opened toothpaste w/ loose cap; min A to squeeze paste)  Cues: Physical assistance; Tactile cues provided;Verbal cues provided    Cognitive Retraining  Safety/Judgement: Decreased awareness of environment    Therapeutic Exercises:   Pt performs AROM to bilateral UEs, reporting minimal pain in L shoulder. She is able to reach out and across midline for ADL items with proximal support at L elbow. Pt is able to reach across to R shoulder with L UE with increased time. Pain:  Pt reporting minimal pain in L shoulder during activity. Activity Tolerance:   Fair  Please refer to the flowsheet for vital signs taken during this treatment. After treatment patient left in no apparent distress:   Sitting in chair, Call bell within reach, Bed / chair alarm activated and RN notified    COMMUNICATION/COLLABORATION:   The patients plan of care was discussed with: Physical therapist and Registered nurse.      David Oates OT  Time Calculation: 30 mins

## 2020-09-24 NOTE — PROGRESS NOTES
Daily Progress Note: 9/24/2020  Lizzie Cheema MD    Assessment/Plan:   Weakness - POA, likely due to dehydration/multifactorial.  Neuro checks and neurology consult. Fall precautions and PT/OT/speech evaluation. lipid panel with excellent LDL. MRI brain ok, ECHO ok. Continue ASA and statin     Acute metabolic encephalopathy - POA, mild. Likely due to dehydration in setting of early dementia, ultram may contribute but needed for pain. Hydrate. Outpatient neuropsych testing.     Dehydration / Hypokalemia - POA due to poor PO intake. Hydrate and repleted K. Hold HCTZ for now      Hypotension / HTN (hypertension) - POA, stop HCTZ for now and see if she feels better, start Lower dose ARB or ARB will be added - permissive HTN in 79 yo woman.     Anemia - POA, mild, but will worsen with hydration. Check serologies.     High cholesterol - Check panel. Continue simvastatin. Severe arthritis left shoulder  - Ortho consulted and injected 9/23    Hx Renal cancer - Outpatient follow up.        Problem List:  Problem List as of 9/24/2020 Date Reviewed: 9/21/2020          Codes Class Noted - Resolved    Anemia ICD-10-CM: D64.9  ICD-9-CM: 285.9  9/21/2020 - Present        Hypokalemia ICD-10-CM: E87.6  ICD-9-CM: 276.8  9/21/2020 - Present        Weakness ICD-10-CM: R53.1  ICD-9-CM: 780.79  9/21/2020 - Present        Acute metabolic encephalopathy ONF-58-BR: G93.41  ICD-9-CM: 348.31  9/21/2020 - Present        Hypotension ICD-10-CM: I95.9  ICD-9-CM: 458.9  9/21/2020 - Present        Dehydration ICD-10-CM: E86.0  ICD-9-CM: 276.51  9/21/2020 - Present        Renal cancer ([facility] [address] ICD-10-CM: C64.9  ICD-9-CM: 189.0  9/2/2020 - Present    Overview Signed 9/21/2020  5:31 PM by Arthur Lunsford MD     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 She is s/p a right robotic partial nephrectomy 3/20/17 for a t1a clear cell renal carcinoma             RESOLVED: Urinary retention ICD-10-CM: R33.9  ICD-9-CM: 788.20  3/31/2018 - 9/21/2020        RESOLVED: Acute arthritis ICD-10-CM: M19.90  ICD-9-CM: 716.90  3/31/2018 - 9/21/2020        RESOLVED: Elevated serum creatinine ICD-10-CM: R79.89  ICD-9-CM: 790.99  3/30/2018 - 9/21/2020        RESOLVED: Hyperglycemia ICD-10-CM: R73.9  ICD-9-CM: 790.29  3/30/2018 - 9/21/2020              Subjective:     80 y.o.  female who presented to the Emergency Department complaining of weakness. Noted to worsen over a week, with perhaps left weakness greater than right. Per patient this is due to severe R shoulder pain. Also more confused, but better in ER now. She reports not eating and drinking well due to pain, and taking extra Ultram. ER finds dehydration and normal head imaging. We will admit her for obeservation. (Dr Keisha Mayberry). 9/22: No complaints this AM except \"feel weak. \"  She is not sure why she is here. She may need inpt rehab.      9/23:  No complaint except left shoulder pain and usual back pain. ECHO basically ok. Neuro and Ortho have seen and Ortho plans IA injection for left shoulder. Will resume tramadol for her shoulder pain. Discussed with pts son and inpt rehab planned. He wants us to consider a different NSAID as there may not be cross reactivity with NSAIDS of different classes - will leave decision up to Ortho.      9/24:  No complaints except shoulder pain.   Shoulder injected yesterday by Ortho but she does not remember that she had an injection yesterday. AM labs pending. Labs ordered yesterday not found.       Review of Systems:   A comprehensive review of systems was negative except for that written in the HPI. Objective:   Physical Exam:     Visit Vitals  /69 (BP 1 Location: Left arm, BP Patient Position: At rest)   Pulse 80   Temp 98.1 °F (36.7 °C)   Resp 18   Ht 5' 1\" (1.549 m)   Wt 60.3 kg (133 lb)   SpO2 96%   BMI 25.13 kg/m²      O2 Device: Room air  Temp (24hrs), Av.2 °F (36.8 °C), Min:97.9 °F (36.6 °C), Max:98.3 °F (36.8 °C)    1901 -  0700  In: 120 [P.O.:120]  Out: 900 [Urine:900]    07 -  1900  In: 4902.5 [P.O.:640; I.V.:4262.5]  Out: 1875 [Urine:1875]  General:  Alert, cooperative, frail appearing, no distress, appears stated age. Head:  Normocephalic, without obvious abnormality, atraumatic. Eyes:  Conjunctivae/corneas clear. PERRL, EOMs intact. Neck: Supple, symmetrical, trachea midline, no adenopathy, thyroid: no enlargement/tenderness/nodules, no carotid bruit and no JVD. Lungs:   Clear to auscultation bilaterally. Chest wall:  No tenderness or deformity. Heart:  Regular rate and rhythm, S1, S2 normal, no murmur, click, rub or gallop. Abdomen:   Soft, non-tender. Bowel sounds normal. No masses,  No organomegaly. Extremities: no cyanosis or edema. No calf tenderness or cords. Shoulder unchanged - painful with any movement. Pulses: 2+ and symmetric all extremities. Skin: Skin color, texture, turgor normal. No rashes   Neurologic:   Alert and oriented X 2-3 today. She does not remember that she had a shoulder injection yesterday. Knows she is in [place].  equally weak. No cogwheeling or rigidity. Gait not tested at this time. Sensation grossly normal to touch.   Gross motor of extremities normal.       Data Review:   EXAM: MRI BRAIN WO CONT  INDICATION: tia  COMPARISON: 2014  CONTRAST: None.  FINDINGS:  Diffusion imaging does not show acute ischemic changes. There is no extra-axial fluid collection, hemorrhage or shift. Flow voids in major vessels at the base of the brain are present. There is no mass. There is moderate atrophy. There is mild nonspecific white matter changes. IMPRESSION:  1. No acute findings no mass. 2. Atrophy and white matter disease. ECHO 9/22/20  Interpretation Summary   Result status: Final result    · LV: Estimated LVEF is 55 - 60%. Normal systolic function (ejection fraction normal). Small left ventricle. Mild concentric hypertrophy. Wall motion: normal. Left ventricular diastolic dysfunction. · LA: Mildly dilated left atrium. · AV: Mild aortic valve regurgitation is present. · MV: Mitral valve non-specific thickening. · PV: Moderate pulmonic valve regurgitation is present. · No right to left shunting       Recent Days:  Recent Labs     09/22/20  0658 09/21/20  1259   WBC 6.2 6.9   HGB 11.8 11.1*   HCT 37.2 34.4*    218     Recent Labs     09/22/20  0658 09/21/20  1259    136   K 5.3* 3.4*    102   CO2 25 30   * 97   BUN 25* 38*   CREA 0.94 0.90   CA 10.8* 11.0*   MG 2.0  --    ALB 3.2* 3.4*   TBILI 1.1* 1.0   ALT 32 32   INR  --  1.0     No results for input(s): PH, PCO2, PO2, HCO3, FIO2 in the last 72 hours.     24 Hour Results:  Recent Results (from the past 24 hour(s))   SARS-COV-2    Collection Time: 09/23/20 10:44 AM   Result Value Ref Range    Specimen source Nasopharyngeal      SARS-CoV-2 PENDING     Specimen source Nasopharyngeal      Specimen type NP Swab      Health status Symptomatic Testing         Medications reviewed  Current Facility-Administered Medications   Medication Dose Route Frequency    0.9% sodium chloride infusion  75 mL/hr IntraVENous CONTINUOUS    traMADoL (ULTRAM) tablet 50 mg  50 mg Oral Q6H PRN    famotidine (PEPCID) tablet 20 mg  20 mg Oral DAILY    losartan (COZAAR) tablet 25 mg  25 mg Oral DAILY  aspirin chewable tablet 81 mg  81 mg Oral DAILY    atorvastatin (LIPITOR) tablet 20 mg  20 mg Oral QHS    acetaminophen (TYLENOL) tablet 650 mg  650 mg Oral Q6H PRN    Or    acetaminophen (TYLENOL) suppository 650 mg  650 mg Rectal Q6H PRN    polyethylene glycol (MIRALAX) packet 17 g  17 g Oral DAILY PRN    ondansetron (ZOFRAN ODT) tablet 4 mg  4 mg Oral Q8H PRN    Or    ondansetron (ZOFRAN) injection 4 mg  4 mg IntraVENous Q6H PRN    enoxaparin (LOVENOX) injection 40 mg  40 mg SubCUTAneous DAILY     Care Plan discussed with: Patient and Nurse  Total time spent with patient: 30 minutes.   Tabitha Grijalva MD

## 2020-09-24 NOTE — PROGRESS NOTES
Transition of Care Plan:  RUR-13%-low risk  1. Continue to monitor patient's response to treatment. 2. Patient has been upgraded to IP  3. Son Lyle Valenzuela -[phone] and he would like to proceed with [address] [place] [address] rehab if we can. 4. Covid negative x2 sent to [address] [place] [address]; cxr pending  5. When medically stable pt to d/c to [address] [place] [address] transported by w/c [facility] ([place]jeancarl[facility]   5. Case Management following  RICKY Pollard RN.

## 2020-09-24 NOTE — PROGRESS NOTES
Problem: Mobility Impaired (Adult and Pediatric)  Goal: *Acute Goals and Plan of Care (Insert Text)  Description: FUNCTIONAL STATUS PRIOR TO ADMISSION: Patient was modified independent using a rollator for functional mobility. HOME SUPPORT PRIOR TO ADMISSION: The patient lived alone with daily morning care aide x 2 hrs to provide assistance with bathing and dressing. Physical Therapy Goals  Initiated 9/22/2020  1. Patient will move from supine to sit and sit to supine , scoot up and down, and roll side to side in bed with minimal assistance/contact guard assist within 7 day(s). 2.  Patient will transfer from bed to chair and chair to bed with minimal assistance/contact guard assist using the least restrictive device within 7 day(s). 3.  Patient will perform sit to stand with minimal assistance/contact guard assist within 7 day(s). 4.  Patient will ambulate with minimal assistance/contact guard assist for 25 feet with the least restrictive device within 7 day(s). Outcome: Progressing Towards Goal   PHYSICAL THERAPY TREATMENT  Patient: Patrice Oquendo (69 y.o. female)  Date: 9/24/2020  Diagnosis: Weakness [R53.1]  Weakness [R53.1]   <principal problem not specified>       Precautions: Bed Alarm, Fall  Chart, physical therapy assessment, plan of care and goals were reviewed. ASSESSMENT  Patient continues with skilled PT services and is not progressing towards goals. Patient continues to require +2 max assist for transfers. She cannot effectively use rollator and is fearful of falling. Recommend SNF. Current Level of Function Impacting Discharge (mobility/balance): Sat on edge of bed with max assist of 2. Patient leans laterally to left and slightly posteriorly which she can correct somewhat with cues. Attempted standing using rollator but patient is not safe.  Stand pivot transfer to recliner performed with +2 max assist; patient attempting to take 2 steps but not functional.  Continues to complain of left shoulder pain, though she did remember that she had a shot yesterday. Propped up in recliner with pillow support to both sides. Discussed with nursing to use lift team to return patient to bed. Other factors to consider for discharge: max assist for bed mobility and transfers         PLAN :  Patient continues to benefit from skilled intervention to address the above impairments. Continue treatment per established plan of care. to address goals. Recommendation for discharge: (in order for the patient to meet his/her long term goals)  Therapy up to 5 days/week in SNF setting    This discharge recommendation:  Has not yet been discussed the attending provider and/or case management    IF patient discharges home will need the following DME: none       SUBJECTIVE:   Patient stated That's my bad arm (referring to the left side).     OBJECTIVE DATA SUMMARY:   Critical Behavior:  Neurologic State: Alert, Eyes open spontaneously  Orientation Level: Oriented X4  Cognition: Follows commands  Safety/Judgement: Decreased awareness of environment, Fall prevention, Insight into deficits  Functional Mobility Training:  Bed Mobility:  Rolling: Assist x2;Maximum assistance  Supine to Sit: Additional time;Maximum assistance;Assist x2     Scooting: Maximum assistance        Transfers:  Sit to Stand: Assist x2;Maximum assistance; Additional time  Stand to Sit: Moderate assistance        Bed to Chair: Assist x2;Maximum assistance; Additional time                    Balance:  Sitting: Impaired(leans to left)  Standing: Impaired  Standing - Static: Constant support  Standing - Dynamic : Constant support  Ambulation/Gait Training:  Distance (ft): (2 steps)  Assistive Device: Gait belt                                                                Pain Rating:  Left shoulder but did not rate. Activity Tolerance:   Fair; fatigues easily.    Please refer to the flowsheet for vital signs taken during this treatment. After treatment patient left in no apparent distress:   Sitting in chair, Call bell within reach, and Bed / chair alarm activated    COMMUNICATION/COLLABORATION:   The patients plan of care was discussed with: Occupational therapist and Registered nurse.      Sabino Alicea, PT   Time Calculation: 23 mins

## 2020-09-24 NOTE — PROGRESS NOTES
Bedside shift change report given to Tea (oncoming nurse) by [address] (offgoing nurse). Report included the following information SBAR, Kardex, MAR and Recent Results.

## 2020-09-25 VITALS
HEART RATE: 72 BPM | HEIGHT: 61 IN | DIASTOLIC BLOOD PRESSURE: 54 MMHG | WEIGHT: 133 LBS | RESPIRATION RATE: 17 BRPM | BODY MASS INDEX: 25.11 KG/M2 | OXYGEN SATURATION: 96 % | TEMPERATURE: 97.4 F | SYSTOLIC BLOOD PRESSURE: 114 MMHG

## 2020-09-25 LAB
ALBUMIN SERPL-MCNC: 2.8 G/DL (ref 3.5–5)
ALBUMIN/GLOB SERPL: 0.7 {RATIO} (ref 1.1–2.2)
ALP SERPL-CCNC: 72 U/L (ref 45–117)
ALT SERPL-CCNC: 25 U/L (ref 12–78)
ANION GAP SERPL CALC-SCNC: 6 MMOL/L (ref 5–15)
AST SERPL-CCNC: 26 U/L (ref 15–37)
BASOPHILS # BLD: 0 K/UL (ref 0–0.1)
BASOPHILS NFR BLD: 1 % (ref 0–1)
BILIRUB SERPL-MCNC: 1 MG/DL (ref 0.2–1)
BUN SERPL-MCNC: 21 MG/DL (ref 6–20)
BUN/CREAT SERPL: 24 (ref 12–20)
CALCIUM SERPL-MCNC: 10.4 MG/DL (ref 8.5–10.1)
CHLORIDE SERPL-SCNC: 108 MMOL/L (ref 97–108)
CO2 SERPL-SCNC: 25 MMOL/L (ref 21–32)
CREAT SERPL-MCNC: 0.86 MG/DL (ref 0.55–1.02)
DIFFERENTIAL METHOD BLD: NORMAL
EOSINOPHIL # BLD: 0.4 K/UL (ref 0–0.4)
EOSINOPHIL NFR BLD: 6 % (ref 0–7)
ERYTHROCYTE [DISTWIDTH] IN BLOOD BY AUTOMATED COUNT: 13.3 % (ref 11.5–14.5)
GLOBULIN SER CALC-MCNC: 3.8 G/DL (ref 2–4)
GLUCOSE SERPL-MCNC: 88 MG/DL (ref 65–100)
HCT VFR BLD AUTO: 38.9 % (ref 35–47)
HGB BLD-MCNC: 12 G/DL (ref 11.5–16)
IMM GRANULOCYTES # BLD AUTO: 0 K/UL (ref 0–0.04)
IMM GRANULOCYTES NFR BLD AUTO: 0 % (ref 0–0.5)
LYMPHOCYTES # BLD: 1.3 K/UL (ref 0.8–3.5)
LYMPHOCYTES NFR BLD: 24 % (ref 12–49)
MCH RBC QN AUTO: 30.1 PG (ref 26–34)
MCHC RBC AUTO-ENTMCNC: 30.8 G/DL (ref 30–36.5)
MCV RBC AUTO: 97.5 FL (ref 80–99)
MONOCYTES # BLD: 0.5 K/UL (ref 0–1)
MONOCYTES NFR BLD: 9 % (ref 5–13)
NEUTS SEG # BLD: 3.4 K/UL (ref 1.8–8)
NEUTS SEG NFR BLD: 60 % (ref 32–75)
NRBC # BLD: 0 K/UL (ref 0–0.01)
NRBC BLD-RTO: 0 PER 100 WBC
PLATELET # BLD AUTO: 235 K/UL (ref 150–400)
PMV BLD AUTO: 9.1 FL (ref 8.9–12.9)
POTASSIUM SERPL-SCNC: 4.2 MMOL/L (ref 3.5–5.1)
PROT SERPL-MCNC: 6.6 G/DL (ref 6.4–8.2)
RBC # BLD AUTO: 3.99 M/UL (ref 3.8–5.2)
SODIUM SERPL-SCNC: 139 MMOL/L (ref 136–145)
WBC # BLD AUTO: 5.7 K/UL (ref 3.6–11)

## 2020-09-25 PROCEDURE — 74011250637 HC RX REV CODE- 250/637: Performed by: INTERNAL MEDICINE

## 2020-09-25 PROCEDURE — 36415 COLL VENOUS BLD VENIPUNCTURE: CPT

## 2020-09-25 PROCEDURE — 80053 COMPREHEN METABOLIC PANEL: CPT

## 2020-09-25 PROCEDURE — 96372 THER/PROPH/DIAG INJ SC/IM: CPT

## 2020-09-25 PROCEDURE — 74011250637 HC RX REV CODE- 250/637: Performed by: FAMILY MEDICINE

## 2020-09-25 PROCEDURE — 85025 COMPLETE CBC W/AUTO DIFF WBC: CPT

## 2020-09-25 PROCEDURE — 74011250636 HC RX REV CODE- 250/636: Performed by: INTERNAL MEDICINE

## 2020-09-25 PROCEDURE — 77030038269 HC DRN EXT URIN PURWCK BARD -A

## 2020-09-25 PROCEDURE — 97530 THERAPEUTIC ACTIVITIES: CPT

## 2020-09-25 PROCEDURE — 74011250636 HC RX REV CODE- 250/636: Performed by: FAMILY MEDICINE

## 2020-09-25 RX ORDER — FAMOTIDINE 20 MG/1
20 TABLET, FILM COATED ORAL DAILY
Qty: 30 TAB | Refills: 0 | Status: SHIPPED
Start: 2020-09-26

## 2020-09-25 RX ORDER — ONDANSETRON 4 MG/1
4 TABLET, ORALLY DISINTEGRATING ORAL
Qty: 30 TAB | Refills: 0 | Status: SHIPPED
Start: 2020-09-25

## 2020-09-25 RX ORDER — ACETAMINOPHEN 325 MG/1
650 TABLET ORAL
Qty: 30 TAB | Refills: 0 | Status: SHIPPED
Start: 2020-09-25

## 2020-09-25 RX ADMIN — TRAMADOL HYDROCHLORIDE 50 MG: 50 TABLET, FILM COATED ORAL at 15:43

## 2020-09-25 RX ADMIN — LOSARTAN POTASSIUM 25 MG: 50 TABLET, FILM COATED ORAL at 08:18

## 2020-09-25 RX ADMIN — FAMOTIDINE 20 MG: 20 TABLET, FILM COATED ORAL at 08:18

## 2020-09-25 RX ADMIN — TRAMADOL HYDROCHLORIDE 50 MG: 50 TABLET, FILM COATED ORAL at 05:09

## 2020-09-25 RX ADMIN — ACETAMINOPHEN 650 MG: 325 TABLET ORAL at 08:17

## 2020-09-25 RX ADMIN — ENOXAPARIN SODIUM 40 MG: 40 INJECTION SUBCUTANEOUS at 08:16

## 2020-09-25 RX ADMIN — SODIUM CHLORIDE 75 ML/HR: 900 INJECTION, SOLUTION INTRAVENOUS at 05:14

## 2020-09-25 RX ADMIN — ASPIRIN 81 MG 81 MG: 81 TABLET ORAL at 08:17

## 2020-09-25 NOTE — PROGRESS NOTES
Bedside and Verbal shift change report given to JACKIE Munoz (oncoming nurse) by Zoe SANTOYO RN (offgoing nurse).  Report included the following information SBAR, Kardex, Intake/Output, MAR and Recent Results

## 2020-09-25 NOTE — DISCHARGE SUMMARY
Physician Discharge Summary     Patient ID:    Jamie Bedoya  [id number]  81 y.o.  5/7/1931  Darren Kim MD    Admit date: 9/21/2020  Discharge date and time: 9/25/2020  Admission Diagnoses: Weakness [R53.1]; Weakness [R53.1]    Discharge Medications:   Current Discharge Medication List      START taking these medications    Details   acetaminophen (TYLENOL) 325 mg tablet Take 2 Tabs by mouth every six (6) hours as needed for Pain or Fever. Qty: 30 Tab, Refills: 0      famotidine (PEPCID) 20 mg tablet Take 1 Tab by mouth daily. Qty: 30 Tab, Refills: 0      ondansetron (ZOFRAN ODT) 4 mg disintegrating tablet Take 1 Tab by mouth every eight (8) hours as needed for Nausea or Vomiting. Qty: 30 Tab, Refills: 0         CONTINUE these medications which have NOT CHANGED    Details   olmesartan (BENICAR) 40 mg tablet Take 40 mg by mouth daily. docusate sodium (COLACE) 100 mg capsule Take 100 mg by mouth daily. aspirin 81 mg chewable tablet Take 81 mg by mouth daily. cholecalciferol, vitamin D3, (VITAMIN D3) 2,000 unit tab Take 1 Tab by mouth daily. simvastatin (ZOCOR) 40 mg tablet Take 40 mg by mouth nightly. LECITHIN PO Take 1,200 mg by mouth daily. STOP taking these medications       hydroCHLOROthiazide (HYDRODIURIL) 25 mg tablet Comments:   Reason for Stopping:  Holding due to dehydration - restart as needed or may need different BP med        traMADoL (ULTRAM) 50 mg tablet Comments:   Reason for Stopping: held due to mental status changes - may need to resume some pain meds as she gets more active          Follow up Care:    1. Darren Kim MD with in 1 weeks  2.  specialists as directed. Diet:  Regular Diet  Disposition:  SNF.   Advanced Directive:  Discharge Exam:  [See today's progress note.]  CONSULTATIONS: Orthopedic Surgery    Significant Diagnostic Studies:   Recent Labs     09/25/20  0500 09/24/20  0630   WBC 5.7 5.8   HGB 12.0 11.5   HCT 38.9 36.6    226 Recent Labs     09/25/20  0500 09/24/20  0630    137   K 4.2 4.3    107   CO2 25 25   BUN 21* 18   CREA 0.86 0.76   GLU 88 90   CA 10.4* 10.6*     Recent Labs     09/25/20  0500 09/24/20  0630   ALT 25 28   AP 72 68   TBILI 1.0 1.0   TP 6.6 6.6   ALB 2.8* 2.9*   GLOB 3.8 3.7     Lab Results   Component Value Date/Time    Glucose (POC) 109 (H) 01/24/2011 06:39 AM     Lab Results   Component Value Date/Time    TSH 1.43 09/21/2020 12:59 PM     HOSPITAL COURSE & TREATMENT RENDERED:   1. See today's progress note:  Daily Progress Note and Discharge Note: 9/25/2020  Rhett Arevalo MD         Assessment/Plan:   [facility], likely due to dehydration/multifactorial.  Neuro checks and neurology consult.  Fall precautions and PT/OT/speech evaluation. lipid panel with excellent LDL. MRI brain ok, ECHO ok.  Continue ASA and statin     Acute metabolic encephalopathy - POA, mild.  Likely due to dehydration in setting of early dementia, ultram may contribute but needed for pain. Hydrate. Outpatient neuropsych testing.     Dehydration / Hypokalemia - POA due to poor PO intake.  Hydrate and repleted K.  Hold HCTZ for now      Hypotension / HTN (hypertension) - POA, stop HCTZ for now and see if she feels better, start Lower dose ARB or ARB will be added - permissive HTN in 79 yo woman.     Anemia - POA, mild, but will worsen with hydration.  Check serologies.     High cholesterol - Check panel. Continue simvastatin.       Severe arthritis left shoulder  - Ortho consulted and injected 9/23     Hx Renal cancer - Outpatient follow up.         Problem List:             Problem List as of 9/25/2020 Date Reviewed: 9/21/2020           Codes Class Noted - Resolved     Anemia ICD-10-CM: D64.9  ICD-9-CM: 472. 9   9/21/2020 - Present           Hypokalemia ICD-10-CM: E87.6  ICD-9-CM: 276.8   9/21/2020 - Present           Weakness ICD-10-CM: R53.1  ICD-9-CM: 780.79   9/21/2020 - Present           Acute metabolic encephalopathy SZS-89-PO: G93.41  ICD-9-CM: 348.31   9/21/2020 - Present           Hypotension ICD-10-CM: I95.9  ICD-9-CM: 522. 9   9/21/2020 - Present           Dehydration ICD-10-CM: E86.0  ICD-9-CM: 276.51   9/21/2020 - Present           Renal cancer ([facility] ICD-10-CM: C64.9  ICD-9-CM: 189. 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 She is s/p a right robotic partial nephrectomy 3/20/17 for a t1a clear cell renal carcinoma                 HTN (hypertension) (Chronic) ICD-10-CM: I10  ICD-9-CM: 401. 9   3/30/2018 - Present           High cholesterol (Chronic) ICD-10-CM: E78.00  ICD-9-CM: 272.0   3/30/2018 - Present           RESOLVED: Renal cancer ([facility] ICD-10-CM: C64.9  ICD-9-CM: 189. 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 She is s/p a right robotic partial nephrectomy 3/20/17 for a t1a clear cell renal carcinoma                 RESOLVED: Urinary retention ICD-10-CM: R33.9  ICD-9-CM: 788.20   3/31/2018 - 9/21/2020           RESOLVED: Acute arthritis ICD-10-CM: M19.90  ICD-9-CM: 716.90   3/31/2018 - 9/21/2020           RESOLVED: Elevated serum creatinine ICD-10-CM: R79.89  ICD-9-CM: 790.99   3/30/2018 - 9/21/2020           RESOLVED: Hyperglycemia ICD-10-CM: R73.9  ICD-9-CM: 790.29   3/30/2018 - 9/21/2020                  Subjective:     89 y.o.   female who presented to the Emergency Department complaining of weakness.  Noted to worsen over a week, with perhaps left weakness greater than right.  Per patient this is due to severe R shoulder pain.  Also more confused, but better in ER now. Gaurav Hahn reports not eating and drinking well due to pain, and taking extra Ultram. ER finds dehydration and normal head imaging.  We will admit her for obeservation. (Dr Belinda Velasquez).     : No complaints this AM except \"feel weak. \"  She is not sure why she is here. She may need inpt rehab.       :  No complaint except left shoulder pain and usual back pain. ECHO basically ok. Neuro and Ortho have seen and Ortho plans IA injection for left shoulder. Will resume tramadol for her shoulder pain. Discussed with pts son and inpt rehab planned. He wants us to consider a different NSAID as there may not be cross reactivity with NSAIDS of different classes - will leave decision up to Ortho.       :  No complaints except shoulder pain. Shoulder injected yesterday by Ortho but she does not remember that she had an injection yesterday. AM labs pending. Labs ordered yesterday not found.     :  C/o lower back pain. Now remembers she had a shoulder injection and reports shoulder is less painful today. Labs ok. BP is up and down - monitor for now. 130PM:  Accepted at [facility] for rehab. She is stable for rehab. Rehab should monitor her BP and add or subtract meds as needed. She may need something more for pain at rehab as she starts to use left shoulder more - will allow rehab MD to see what she may need as she increases activity.  Follow up with PCP early next wk for more workup outpt if still not back to baseline.       Review of Systems:   A comprehensive review of systems was negative except for that written in the HPI.     Objective:   Physical Exam:   Visit Vitals  BP (!) 171/80 (BP 1 Location: Right arm, BP Patient Position: At rest)   Pulse 62   Temp 98.6 °F (37 °C)   Resp 17   Ht 5' 1\" (1.549 m)   Wt 60.3 kg (133 lb)   SpO2 100%   BMI 25.13 kg/m²      O2 Device: Room air  Temp (24hrs), Av.1 °F (36.7 °C), Min:97.3 °F (36.3 °C), Max:98.6 °F (37 °C)    1901 -  0700  In: 160 [P.O.:160]  Out: 750 [Urine:750]   701 - 1900  In: 4622.5 [P.O.:360; I.V.:4262.5]  Out:  [Urine:192]  General: Alert, cooperative, frail appearing, no distress, appears stated age. Head:  Normocephalic, without obvious abnormality, atraumatic. Eyes:  Conjunctivae/corneas clear. PERRL, EOMs intact. Neck: Supple, symmetrical, trachea midline, no adenopathy, thyroid: no enlargement/tenderness/nodules, no carotid bruit and no JVD. Lungs:   Clear to auscultation bilaterally. Chest wall:  No tenderness or deformity. Heart:  Regular rate and rhythm, S1, S2 normal, no murmur, click, rub or gallop. Abdomen:   Soft, non-tender. Bowel sounds normal. No masses,  No organomegaly. Extremities: no cyanosis or edema. No calf tenderness or cords. Shoulder unchanged - painful with any movement. Pulses: 2+ and symmetric all extremities. Skin: Skin color, texture, turgor normal. No rashes   Neurologic:   Alert and oriented X 3 today. She does now remember that she had a shoulder injection. Knows she is in [facility].  equally weak. No cogwheeling or rigidity. Gait not tested at this time. Sensation grossly normal to touch. Gross motor of extremities normal.        Data Review:   EXAM: MRI BRAIN WO CONT  INDICATION: tia  COMPARISON: July 14, 2014  CONTRAST: None. FINDINGS:  Diffusion imaging does not show acute ischemic changes. There is no extra-axial fluid collection, hemorrhage or shift. Flow voids in major vessels at the base of the brain are present. There is no mass. There is moderate atrophy. There is mild nonspecific white matter changes. IMPRESSION:  1. No acute findings no mass. 2. Atrophy and white matter disease.     ECHO 9/22/20  Interpretation Summary   Result status: Final result    · LV: Estimated LVEF is 55 - 60%. Normal systolic function (ejection fraction normal). Small left ventricle. Mild concentric hypertrophy. Wall motion: normal. Left ventricular diastolic dysfunction. · LA: Mildly dilated left atrium. · AV: Mild aortic valve regurgitation is present.   · MV: Mitral valve non-specific thickening. · PV: Moderate pulmonic valve regurgitation is present.   · No right to left shunting            Lab Results   Component Value Date/Time     Cholesterol, total 181 09/22/2020 06:58 AM     HDL Cholesterol 102 09/22/2020 06:58 AM     LDL, calculated 62.4 09/22/2020 06:58 AM     VLDL, calculated 16.6 09/22/2020 06:58 AM     Triglyceride 83 09/22/2020 06:58 AM     CHOL/HDL Ratio 1.8 09/22/2020 06:58 AM     Signed:  Bobby Go MD  9/25/2020  2:16 PM

## 2020-09-25 NOTE — PROGRESS NOTES
Transition of Care Plan to SNF/Rehab    Communication to Patient/Family:  Met with patient and family and they are agreeable to the transition plan. The Plan for Transition of Care is related to the following treatment goals: The Patient and/or patient representative was provided with a choice of provider and agrees  with the discharge plan. Yes [x] No []    A Freedom of choice list was provided with basic dialogue that supports the patient's individualized plan of care/goals and shares the quality data associated with the providers. Yes [x] No []    SNF/Rehab Transition:  Patient has been accepted to [facility] SNF/Rehab and meets criteria for admission. Patient will transported by [facility] w/c Bland Selina and expected to leave at 5 pm.    Communication to SNF/Rehab:  Bedside RN, Marilu Melo, has been notified to update the transition plan to the facility and call report ([phone]). Discharge information has been updated on the AVS. And communicated to facility via [employer], or CC link. Nursing Please include all hard scripts for controlled substances, med rec and dc summary, and AVS in packet. Reviewed and confirmed with facility, [facility], can manage the patient care needs for the following:     Milta Sovereign with (X) only those applicable:  Medication:  [x]Medications are available at the facility  []IV Antibiotics    []Controlled Substance  hard copies available sent.   []Weekly Labs    Equipment:  []CPAP/BiPAP  []Wound Vacuum  []Hooper or Urinary Device  []PICC/Central Line  []Nebulizer  []Ventilator    Treatment:  []Isolation (for MRSA, VRE, etc.)  []Surgical Drain Management  []Tracheostomy Care  []Dressing Changes  []Dialysis with transportation  []PEG Care  []Oxygen  []Daily Weights for Heart Failure    Dietary:  []Any diet limitations  []Tube Feedings   []Total Parenteral Management (TPN)    Financial Resources:  []Medicaid Application Completed    []UAI Completed and copy given to pt/family  and copy given to pt/family  []A screening has previously been completed. []Level II Completed    [] Private pay individual who will not become   financially eligible for Medicaid within 6 months from admission to a [address] [place] [address] facility. [] Individual refused to have screening conducted. []Medicaid Application Completed    []The screening denied because it was determined individual did not need/did not qualify for nursing facility level of care. [] Out of state residents seeking direct admission to a [address] facility. [] Individuals who are inpatients of an out of state hospital, or in state or out of state veterans/ hospital and seek direct admission to a [address] facility  [] Individuals who are pateints or residents of a state owned/operated facility that is licensed by [employer] of [employer] ([facility] and seek direct admission to [address] [place] [address]  [] A screening not required for enrollment in [address] [address] [address] services as set out in [address] [place] [address] 39-  [] [facility] staff shall perform screenings of the [facility] clients. Advanced Care Plan:  []Surrogate Decision Maker of Care  []POA  []Communicated Code Status and copy sent. Other: RICKY Phillips RN        Transition of Care Plan:  RUR-15%-low risk  1. Continue to monitor patient's response to treatment. 2. Patient has been upgraded to IP  3. Son Jocelyn [phone] agrees with [address] [place] [address] rehab   4. Covid negative x2 sent to [address] [place] [address]; cxr sent in ECIN  5. When medically stable pt to d/c to [facility] transported by w/c Jared Mcguire  5. Case Management following  RICKY Phillips RN.

## 2020-09-25 NOTE — DISCHARGE INSTRUCTIONS
Patient Discharge Instructions    Meir Vega / [id number] : 1931    Admitted 2020 Discharged: 2020 2:14 PM     ACUTE DIAGNOSES:  Weakness [R53.1]  Weakness [R53.1]    CHRONIC MEDICAL DIAGNOSES:  Problem List as of 2020 Date Reviewed: 2020          Codes Class Noted - Resolved    Anemia ICD-10-CM: D64.9  ICD-9-CM: 285.9  2020 - Present        Hypokalemia ICD-10-CM: E87.6  ICD-9-CM: 276.8  2020 - Present        Weakness ICD-10-CM: R53.1  ICD-9-CM: 780.79  2020 - Present        Acute metabolic encephalopathy INE-46-FR: G93.41  ICD-9-CM: 348.31  2020 - Present        Hypotension ICD-10-CM: I95.9  ICD-9-CM: 458.9  2020 - Present        Dehydration ICD-10-CM: E86.0  ICD-9-CM: 276.51  2020 - Present        [facility] ([facility] ICD-10-CM: C64.9  ICD-9-CM: 189.0  2020 - Present    Overview Signed 2020  5:31 PM by Lake Allen MD     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   She is s/p a right robotic partial nephrectomy 3/20/17 for a t1a clear cell renal carcinoma             RESOLVED: Urinary retention ICD-10-CM: R33.9  ICD-9-CM: 788.20  3/31/2018 - 2020        RESOLVED: Acute arthritis ICD-10-CM: M19.90  ICD-9-CM: 716.90  3/31/2018 - 2020        RESOLVED: Elevated serum creatinine ICD-10-CM: R79.89  ICD-9-CM: 790.99  3/30/2018 - 2020        RESOLVED: Hyperglycemia ICD-10-CM: R73.9  ICD-9-CM: 790.29  3/30/2018 - 9/21/2020              DISCHARGE MEDICATIONS:         · It is important that you take the medication exactly as they are prescribed. · Keep your medication in the bottles provided by the pharmacist and keep a list of the medication names, dosages, and times to be taken in your wallet. · Do not take other medications without consulting your doctor. DIET:  Regular Diet    ACTIVITY: Activity as tolerated    ADDITIONAL INFORMATION: If you experience any of the following symptoms then please call your primary care physician or return to the emergency room if you cannot get hold of your doctor: Fever, chills, nausea, vomiting, diarrhea, change in mentation, falling, bleeding, shortness of breath. FOLLOW UP CARE:  Dr. Bear Samaniego, Qasim Schroeder MD  you are to call and set up an appointment to see them with in 1 week. Follow-up with specialists at directed by them      Information obtained by :  I understand that if any problems occur once I am at home I am to contact my physician. I understand and acknowledge receipt of the instructions indicated above.                                                                                                                                            Physician's or R.N.'s Signature                                                                  Date/Time                                                                                                                                              Patient or Representative Signature                                                          Date/Time

## 2020-09-25 NOTE — PROGRESS NOTES
Problem: Self Care Deficits Care Plan (Adult)  Goal: *Acute Goals and Plan of Care (Insert Text)  Description: FUNCTIONAL STATUS PRIOR TO ADMISSION: Patient was modified independent using a rollator for functional mobility. Pt required assistance for ADLs, mostly for bathing and dressing. Pt reports needing increased assistance with UB bathing/dressing due to impaired ROM. HOME SUPPORT PRIOR TO ADMISSION: The patient lived alone with daily morning care aide x 2 hrs to provide assistance with bathing and dressing. Occupational Therapy Goals  Initiated 9/22/2020  1. Patient will perform static standing with CGA for 1 minute in preparation for OOB ADLs within 7 day(s). 2.  Patient will perform upper body dressing with moderate assistance within 7 day(s). 3.  Patient will perform lower body dressing and bathing with minimal assistance within 7 day(s). 4.  Patient will perform toilet transfers with minimal assistance/contact guard assist within 7 day(s). 5.  Patient will perform all aspects of toileting with minimal assistance/contact guard assist within 7 day(s). 6.  Patient will participate in upper extremity therapeutic exercise/activities with supervision/set-up for 3 minutes within 7 day(s). 7.  Patient will utilize energy conservation techniques during functional activities with verbal cues within 7 day(s). Outcome: Progressing Towards Goal   OCCUPATIONAL THERAPY TREATMENT  Patient: Lindy Diaz (56 y.o. female)  Date: 9/25/2020  Diagnosis: Weakness [R53.1]  Weakness [R53.1]   <principal problem not specified>       Precautions: Bed Alarm, Fall  Chart, occupational therapy assessment, plan of care, and goals were reviewed. ASSESSMENT  Patient continues with skilled OT services and is slowly progressing towards goals. Patient oriented to self and place however poor historian to previous therapy sessions.   She is limited secondary to extensive arthritis, B shoulder impairments, scoliosis, kyphotic posture, deconditioning/generalized weakness and impaired balance all impacting her ability to complete basic ADL tasks and transfers. She demonstrated increased independence with bed mobility on this however unable to safety progress to transfer to chair. Nursing reports x3 assist for chair to bed last night. Completed unsupported sitting to prepare for self care transfer, noted kyphotic posture and scoliosis in sitting and standing. Sit <> stand x 2 trials with max AX2 with poor posture and posterior lean with inability to change COG to progress to full stand or advance either LE. Attempted with HHA and with rollator. Left in bed in chair position and set up for lunch. Able to use RUE for utensil mgmt. Current Level of Function Impacting Discharge (ADLs): Ub care max A, Lb care total A, self care transfer recommend Ashlyn lift at this time    Other factors to consider for discharge: Patient was living alone with brief aide support at [address] [place] [address]. Patient is presenting far below baseline and is unsafe for dc at this time. PLAN :  Patient continues to benefit from skilled intervention to address the above impairments. Continue treatment per established plan of care. to address goals. Recommend with staff: Kevin Bain for self care transfers, set up for meals and basic grooming, bed in chair position for meals     Recommend next OT session: seated ADL tasks    Recommendation for discharge: (in order for the patient to meet his/her long term goals)  Therapy up to 5 days/week in SNF setting    This discharge recommendation:  Has been made in collaboration with the attending provider and/or case management    IF patient discharges home will need the following DME: TBD SNF       SUBJECTIVE:   Patient stated Do you know what happened? I was on the toilet and I could not get up for four hours. Security came in to help me.   which lead to admission    OBJECTIVE DATA SUMMARY: Cognitive/Behavioral Status:  Neurologic State: Alert; Appropriate for age  Orientation Level: Oriented X4  Cognition: Memory loss(in regards to previous therapy sessions)             Functional Mobility and Transfers for ADLs:  Bed Mobility:  Supine to Sit: Moderate assistance;Assist x2; Additional time;Bed Modified  Sit to Supine: Maximum assistance;Assist x2    Transfers:  Sit to Stand: Maximum assistance;Assist x2          Balance:  Sitting: Impaired  Sitting - Static: Fair (occasional); Good (unsupported)  Sitting - Dynamic: Fair (occasional)  Standing: Impaired; With support  Standing - Static: Poor;Constant support  Standing - Dynamic : Not tested    ADL Intervention:  Feeding  Cutting Food: Total assistance (dependent)  Utensil Management: Set-up  Food to Mouth: Set-up(environmental set up- bed in chair position)    Grooming  Brushing/Combing Hair: Total assistance (dependent)(poor shoulder mobility)     Lower Body Dressing Assistance  Socks: Total assistance (dependent)    Pain:  No c/o pain    Activity Tolerance:   Fair    After treatment patient left in no apparent distress:   Sitting in chair and Call bell within reach    COMMUNICATION/COLLABORATION:   The patients plan of care was discussed with: Physical therapist and Registered nurse.      Lonnie Mercado OT  Time Calculation: 29 mins

## 2020-09-25 NOTE — PROGRESS NOTES
Daily Progress Note and Discharge Note: 9/25/2020  Miguel Werner MD    Assessment/Plan:   Weakness - POA, likely due to dehydration/multifactorial.  Neuro checks and neurology consult. Fall precautions and PT/OT/speech evaluation. lipid panel with excellent LDL. MRI brain ok, ECHO ok. Continue ASA and statin     Acute metabolic encephalopathy - POA, mild. Likely due to dehydration in setting of early dementia, ultram may contribute but needed for pain. Hydrate. Outpatient neuropsych testing.     Dehydration / Hypokalemia - POA due to poor PO intake. Hydrate and repleted K. Hold HCTZ for now      Hypotension / HTN (hypertension) - POA, stop HCTZ for now and see if she feels better, start Lower dose ARB or ARB will be added - permissive HTN in 79 yo woman.     Anemia - POA, mild, but will worsen with hydration. Check serologies.     High cholesterol - Check panel. Continue simvastatin. Severe arthritis left shoulder  - Ortho consulted and injected 9/23    Hx Renal cancer - Outpatient follow up.        Problem List:  Problem List as of 9/25/2020 Date Reviewed: 9/21/2020          Codes Class Noted - Resolved    Anemia ICD-10-CM: D64.9  ICD-9-CM: 285.9  9/21/2020 - Present        Hypokalemia ICD-10-CM: E87.6  ICD-9-CM: 276.8  9/21/2020 - Present        Weakness ICD-10-CM: R53.1  ICD-9-CM: 780.79  9/21/2020 - Present        Acute metabolic encephalopathy DXX-33-FY: G93.41  ICD-9-CM: 348.31  9/21/2020 - Present        Hypotension ICD-10-CM: I95.9  ICD-9-CM: 458.9  9/21/2020 - Present        Dehydration ICD-10-CM: E86.0  ICD-9-CM: 276.51  9/21/2020 - Present        Renal cancer ([facility] [address] ICD-10-CM: C64.9  ICD-9-CM: 189.0  9/2/2020 - Present    Overview Signed 9/21/2020  5:31 PM by Beto Capone MD     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 She is s/p a right robotic partial nephrectomy 3/20/17 for a t1a clear cell renal carcinoma             RESOLVED: Urinary retention ICD-10-CM: R33.9  ICD-9-CM: 788.20  3/31/2018 - 9/21/2020        RESOLVED: Acute arthritis ICD-10-CM: M19.90  ICD-9-CM: 716.90  3/31/2018 - 9/21/2020        RESOLVED: Elevated serum creatinine ICD-10-CM: R79.89  ICD-9-CM: 790.99  3/30/2018 - 9/21/2020        RESOLVED: Hyperglycemia ICD-10-CM: R73.9  ICD-9-CM: 790.29  3/30/2018 - 9/21/2020              Subjective:     80 y.o.  female who presented to the Emergency Department complaining of weakness. Noted to worsen over a week, with perhaps left weakness greater than right. Per patient this is due to severe R shoulder pain. Also more confused, but better in ER now. She reports not eating and drinking well due to pain, and taking extra Ultram. ER finds dehydration and normal head imaging. We will admit her for obeservation. (Dr Ligia Fernandes). 9/22: No complaints this AM except \"feel weak. \"  She is not sure why she is here. She may need inpt rehab.      9/23:  No complaint except left shoulder pain and usual back pain. ECHO basically ok. Neuro and Ortho have seen and Ortho plans IA injection for left shoulder. Will resume tramadol for her shoulder pain. Discussed with pts son and inpt rehab planned. He wants us to consider a different NSAID as there may not be cross reactivity with NSAIDS of different classes - will leave decision up to Ortho.      9/24:  No complaints except shoulder pain. Shoulder injected yesterday by Ortho but she does not remember that she had an injection yesterday. AM labs pending. Labs ordered yesterday not found. :  C/o lower back pain. Now remembers she had a shoulder injection and reports shoulder is less painful today. Labs ok. BP is up and down - monitor for now. 130PM:  Accepted at [facility] for rehab. She is stable for rehab. Rehab should monitor her BP and add or subtract meds as needed. She may need something more for pain at rehab as she starts to use left shoulder more - will allow rehab MD to see what she may need as she increases activity. Follow up with PCP early next wk for more workup outpt if still not back to baseline. Review of Systems:   A comprehensive review of systems was negative except for that written in the HPI. Objective:   Physical Exam:   Visit Vitals  BP (!) 171/80 (BP 1 Location: Right arm, BP Patient Position: At rest)   Pulse 62   Temp 98.6 °F (37 °C)   Resp 17   Ht 5' 1\" (1.549 m)   Wt 60.3 kg (133 lb)   SpO2 100%   BMI 25.13 kg/m²      O2 Device: Room air  Temp (24hrs), Av.1 °F (36.7 °C), Min:97.3 °F (36.3 °C), Max:98.6 °F (37 °C)    1901 -  0700  In: 160 [P.O.:160]  Out: 750 [Urine:750]   701 - 1900  In: 4622.5 [P.O.:360; I.V.:4262.5]  Out: 1925 [[facility]:7760]  General:  Alert, cooperative, frail appearing, no distress, appears stated age. Head:  Normocephalic, without obvious abnormality, atraumatic. Eyes:  Conjunctivae/corneas clear. PERRL, EOMs intact. Neck: Supple, symmetrical, trachea midline, no adenopathy, thyroid: no enlargement/tenderness/nodules, no carotid bruit and no JVD. Lungs:   Clear to auscultation bilaterally. Chest wall:  No tenderness or deformity. Heart:  Regular rate and rhythm, S1, S2 normal, no murmur, click, rub or gallop. Abdomen:   Soft, non-tender. Bowel sounds normal. No masses,  No organomegaly. Extremities: no cyanosis or edema.  No calf tenderness or cords. Shoulder unchanged - painful with any movement. Pulses: 2+ and symmetric all extremities. Skin: Skin color, texture, turgor normal. No rashes   Neurologic:   Alert and oriented X 3 today. She does now remember that she had a shoulder injection. Knows she is in [facility].  equally weak. No cogwheeling or rigidity. Gait not tested at this time. Sensation grossly normal to touch. Gross motor of extremities normal.       Data Review:   EXAM: MRI BRAIN WO CONT  INDICATION: tia  COMPARISON: July 14, 2014  CONTRAST: None. FINDINGS:  Diffusion imaging does not show acute ischemic changes. There is no extra-axial fluid collection, hemorrhage or shift. Flow voids in major vessels at the base of the brain are present. There is no mass. There is moderate atrophy. There is mild nonspecific white matter changes. IMPRESSION:  1. No acute findings no mass. 2. Atrophy and white matter disease. ECHO 9/22/20  Interpretation Summary   Result status: Final result    · LV: Estimated LVEF is 55 - 60%. Normal systolic function (ejection fraction normal). Small left ventricle. Mild concentric hypertrophy. Wall motion: normal. Left ventricular diastolic dysfunction. · LA: Mildly dilated left atrium. · AV: Mild aortic valve regurgitation is present. · MV: Mitral valve non-specific thickening. · PV: Moderate pulmonic valve regurgitation is present.   · No right to left shunting     Lab Results   Component Value Date/Time    Cholesterol, total 181 09/22/2020 06:58 AM    HDL Cholesterol 102 09/22/2020 06:58 AM    LDL, calculated 62.4 09/22/2020 06:58 AM    VLDL, calculated 16.6 09/22/2020 06:58 AM    Triglyceride 83 09/22/2020 06:58 AM    CHOL/HDL Ratio 1.8 09/22/2020 06:58 AM     Recent Days:  Recent Labs     09/25/20  0500 09/24/20  0630 09/22/20  0658   WBC 5.7 5.8 6.2   HGB 12.0 11.5 11.8   HCT 38.9 36.6 37.2    226 257     Recent Labs     09/25/20  0500 09/24/20  0630 09/22/20  8183  137 136   K 4.2 4.3 5.3*    107 106   CO2 25 25 25   GLU 88 90 125*   BUN 21* 18 25*   CREA 0.86 0.76 0.94   CA 10.4* 10.6* 10.8*   MG  --   --  2.0   ALB 2.8* 2.9* 3.2*   TBILI 1.0 1.0 1.1*   ALT 25 28 32     No results for input(s): PH, PCO2, PO2, HCO3, FIO2 in the last 72 hours. 24 Hour Results:  Recent Results (from the past 24 hour(s))   METABOLIC PANEL, COMPREHENSIVE    Collection Time: 09/24/20  6:30 AM   Result Value Ref Range    Sodium 137 136 - 145 mmol/L    Potassium 4.3 3.5 - 5.1 mmol/L    Chloride 107 97 - 108 mmol/L    CO2 25 21 - 32 mmol/L    Anion gap 5 5 - 15 mmol/L    Glucose 90 65 - 100 mg/dL    BUN 18 6 - 20 MG/DL    Creatinine 0.76 0.55 - 1.02 MG/DL    BUN/Creatinine ratio 24 (H) 12 - 20      GFR est AA >60 >60 ml/min/1.73m2    GFR est non-AA >60 >60 ml/min/1.73m2    Calcium 10.6 (H) 8.5 - 10.1 MG/DL    Bilirubin, total 1.0 0.2 - 1.0 MG/DL    ALT (SGPT) 28 12 - 78 U/L    AST (SGOT) 31 15 - 37 U/L    Alk. phosphatase 68 45 - 117 U/L    Protein, total 6.6 6.4 - 8.2 g/dL    Albumin 2.9 (L) 3.5 - 5.0 g/dL    Globulin 3.7 2.0 - 4.0 g/dL    A-G Ratio 0.8 (L) 1.1 - 2.2     CBC WITH AUTOMATED DIFF    Collection Time: 09/24/20  6:30 AM   Result Value Ref Range    WBC 5.8 3.6 - 11.0 K/uL    RBC 3.76 (L) 3.80 - 5.20 M/uL    HGB 11.5 11.5 - 16.0 g/dL    HCT 36.6 35.0 - 47.0 %    MCV 97.3 80.0 - 99.0 FL    MCH 30.6 26.0 - 34.0 PG    MCHC 31.4 30.0 - 36.5 g/dL    RDW 13.2 11.5 - 14.5 %    PLATELET 592 261 - 439 K/uL    MPV 9.2 8.9 - 12.9 FL    NRBC 0.0 0  WBC    ABSOLUTE NRBC 0.00 0.00 - 0.01 K/uL    NEUTROPHILS 67 32 - 75 %    LYMPHOCYTES 17 12 - 49 %    MONOCYTES 9 5 - 13 %    EOSINOPHILS 6 0 - 7 %    BASOPHILS 1 0 - 1 %    IMMATURE GRANULOCYTES 0 0.0 - 0.5 %    ABS. NEUTROPHILS 3.9 1.8 - 8.0 K/UL    ABS. LYMPHOCYTES 1.0 0.8 - 3.5 K/UL    ABS. MONOCYTES 0.5 0.0 - 1.0 K/UL    ABS. EOSINOPHILS 0.3 0.0 - 0.4 K/UL    ABS. BASOPHILS 0.0 0.0 - 0.1 K/UL    ABS. IMM.  GRANS. 0.0 0.00 - 0.04 K/UL    DF AUTOMATED     SED RATE (ESR)    Collection Time: 09/24/20  6:30 AM   Result Value Ref Range    Sed rate, automated 34 (H) 0 - 30 mm/hr   C REACTIVE PROTEIN, QT    Collection Time: 09/24/20  6:30 AM   Result Value Ref Range    C-Reactive protein <0.29 0.00 - 8.32 mg/dL   METABOLIC PANEL, COMPREHENSIVE    Collection Time: 09/25/20  5:00 AM   Result Value Ref Range    Sodium 139 136 - 145 mmol/L    Potassium 4.2 3.5 - 5.1 mmol/L    Chloride 108 97 - 108 mmol/L    CO2 25 21 - 32 mmol/L    Anion gap 6 5 - 15 mmol/L    Glucose 88 65 - 100 mg/dL    BUN 21 (H) 6 - 20 MG/DL    Creatinine 0.86 0.55 - 1.02 MG/DL    BUN/Creatinine ratio 24 (H) 12 - 20      GFR est AA >60 >60 ml/min/1.73m2    GFR est non-AA >60 >60 ml/min/1.73m2    Calcium 10.4 (H) 8.5 - 10.1 MG/DL    Bilirubin, total 1.0 0.2 - 1.0 MG/DL    ALT (SGPT) 25 12 - 78 U/L    AST (SGOT) 26 15 - 37 U/L    Alk. phosphatase 72 45 - 117 U/L    Protein, total 6.6 6.4 - 8.2 g/dL    Albumin 2.8 (L) 3.5 - 5.0 g/dL    Globulin 3.8 2.0 - 4.0 g/dL    A-G Ratio 0.7 (L) 1.1 - 2.2     CBC WITH AUTOMATED DIFF    Collection Time: 09/25/20  5:00 AM   Result Value Ref Range    WBC 5.7 3.6 - 11.0 K/uL    RBC 3.99 3.80 - 5.20 M/uL    HGB 12.0 11.5 - 16.0 g/dL    HCT 38.9 35.0 - 47.0 %    MCV 97.5 80.0 - 99.0 FL    MCH 30.1 26.0 - 34.0 PG    MCHC 30.8 30.0 - 36.5 g/dL    RDW 13.3 11.5 - 14.5 %    PLATELET 837 268 - 915 K/uL    MPV 9.1 8.9 - 12.9 FL    NRBC 0.0 0  WBC    ABSOLUTE NRBC 0.00 0.00 - 0.01 K/uL    NEUTROPHILS 60 32 - 75 %    LYMPHOCYTES 24 12 - 49 %    MONOCYTES 9 5 - 13 %    EOSINOPHILS 6 0 - 7 %    BASOPHILS 1 0 - 1 %    IMMATURE GRANULOCYTES 0 0.0 - 0.5 %    ABS. NEUTROPHILS 3.4 1.8 - 8.0 K/UL    ABS. LYMPHOCYTES 1.3 0.8 - 3.5 K/UL    ABS. MONOCYTES 0.5 0.0 - 1.0 K/UL    ABS. EOSINOPHILS 0.4 0.0 - 0.4 K/UL    ABS. BASOPHILS 0.0 0.0 - 0.1 K/UL    ABS. IMM.  GRANS. 0.0 0.00 - 0.04 K/UL    DF AUTOMATED         Medications reviewed  Current Facility-Administered Medications   Medication Dose Route Frequency    0.9% sodium chloride infusion  75 mL/hr IntraVENous CONTINUOUS    traMADoL (ULTRAM) tablet 50 mg  50 mg Oral Q6H PRN    famotidine (PEPCID) tablet 20 mg  20 mg Oral DAILY    losartan (COZAAR) tablet 25 mg  25 mg Oral DAILY    aspirin chewable tablet 81 mg  81 mg Oral DAILY    atorvastatin (LIPITOR) tablet 20 mg  20 mg Oral QHS    acetaminophen (TYLENOL) tablet 650 mg  650 mg Oral Q6H PRN    Or    acetaminophen (TYLENOL) suppository 650 mg  650 mg Rectal Q6H PRN    polyethylene glycol (MIRALAX) packet 17 g  17 g Oral DAILY PRN    ondansetron (ZOFRAN ODT) tablet 4 mg  4 mg Oral Q8H PRN    Or    ondansetron (ZOFRAN) injection 4 mg  4 mg IntraVENous Q6H PRN    enoxaparin (LOVENOX) injection 40 mg  40 mg SubCUTAneous DAILY     Care Plan discussed with: Patient and Nurse  Total time spent with patient: 30 minutes.   Colleen Umanzor MD

## 2020-09-25 NOTE — PROGRESS NOTES
Hospital to SNF SBAR Handoff - Gianna Orellana                                                                        80 y.o.   female    [address]   Room: 503/01    [facility]  MED SURG 2  Unit Phone# :  [phone]      [address]  [place] [id number]  Dept: [phone]  Loc: [phone]                    SITUATION     Admitted:  9/21/2020         Attending Provider:  Julio Hewitt MD       Consultations:  IP CONSULT TO ORTHOPEDIC SURGERY  IP CONSULT TO NEUROLOGY    PCP:  Iris Frausto MD   [phone]    Treatment Team: Attending Provider: Julio Hewitt MD; Consulting Provider: Julio Hewitt MD; Utilization Review: Esme Cannon; Care Manager: Yuki Yin Admitting Dx:  Weakness [R53.1]  Weakness [R53.1]       Principal Problem: <principal problem not specified>    * No surgery found * of      BY: * Surgery not found *             ON: * No surgery found *                  Code Status: Full Code                Advance Directives:   Advance Care Planning 9/21/2020   Patient's Healthcare Decision Maker is: -   Primary Decision Maker Name -   Primary Decision Maker Phone Number -   Primary Decision Maker Relationship to Patient -   Confirm Advance Directive Yes, not on file    (Send w/patient)   Not Received       Isolation:  There are currently no Active Isolations       MDRO: No current active infections    Pain Medications given:  9/25/20    Last dose: 9/25/2020 at  [address] [place] [address] needed: no  Type of equipment:       BACKGROUND     Allergies: Allergies   Allergen Reactions    Aleve [Naproxen Sodium] Hives    Codeine Nausea and Vomiting       Past Medical History:   Diagnosis Date    High cholesterol     Hypertension     [facility] [address]     Renal cancer ([facility] [address] 9/2/2020    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  She is s/p a right robotic partial nephrectomy 3/20/17 for a t1a clear cell renal carcinoma       Past Surgical History:   Procedure Laterality Date    HX HYSTERECTOMY      HX KNEE REPLACEMENT      right 2010    HX NEPHRECTOMY Right 03/20/2017    PARTIAL     HX NEPHRECTOMY Left 03/26/2018    PARTIAL     HX SHOULDER REPLACEMENT      right 2011       Medications Prior to Admission   Medication Sig    olmesartan (BENICAR) 40 mg tablet Take 40 mg by mouth daily.  hydroCHLOROthiazide (HYDRODIURIL) 25 mg tablet Take 25 mg by mouth daily.  docusate sodium (COLACE) 100 mg capsule Take 100 mg by mouth daily.  aspirin 81 mg chewable tablet Take 81 mg by mouth daily.  traMADoL (ULTRAM) 50 mg tablet Take 50 mg by mouth two (2) times a day.  cholecalciferol, vitamin D3, (VITAMIN D3) 2,000 unit tab Take 1 Tab by mouth daily.  simvastatin (ZOCOR) 40 mg tablet Take 40 mg by mouth nightly.  LECITHIN PO Take 1,200 mg by mouth daily. Hard scripts included in transfer packet no    Vaccinations: There is no immunization history on file for this patient. Readmission Risks:    Known Risks: The Charlson CoMorbitiy Index tool is an evidenced based tool that has more automatic generated information. The tool looks at many different items such as the age of the patient, how many times they were admitted in the last calendar year, current length of stay in the hospital and their diagnosis. All of these items are pulled automatically from information documented in the chart from various places and will generate a score that predicts whether a patient is at low (less than 13), medium (13-20) or high (21 or greater) risk of being readmitted.         ASSESSMENT                Temp: 97.4 °F (36.3 °C) (09/25/20 1532) Pulse (Heart Rate): 72 (09/25/20 1532)     Resp Rate: 17 (09/25/20 1532)           BP: (!) 114/54 (09/25/20 1532)     O2 Sat (%): 96 % (09/25/20 1532)     Weight: 60.3 kg (133 lb)    Height: 5' 1\" (154.9 cm) (09/22/20 1405)       If above not within 1 hour of discharge:    BP:_____  P:____  R:____ T:_____ O2 Sat: ___%  O2: ______    Active Orders   Diet    DIET CARDIAC Regular         Orientation: oriented to time, place, person and situation     Active Behaviors: None                                   Active Lines/Drains:  (Peg Tube / Hooper / CL or S/L?): no    Urinary Status: External catheter     Last BM: Last Bowel Movement Date: (pt states its been several days)     Skin Integrity: Intact             Mobility: Slightly limited   Weight Bearing Status: WBAT (Weight Bearing as Tolerated)      Gait Training  Assistive Device: Gait belt  Ambulation - Level of Assistance: Maximum assistance, Assist x2(unable to take any steps or stand erect due to pain)  Distance (ft): (2 steps)  Stairs - Level of Assistance: (NT)         Lab Results   Component Value Date/Time    Glucose 88 09/25/2020 05:00 AM    Hemoglobin A1c 5.6 09/22/2020 06:58 AM    INR 1.0 09/21/2020 12:59 PM    INR 1.0 12/06/2016 02:57 PM    HGB 12.0 09/25/2020 05:00 AM    HGB 11.5 09/24/2020 06:30 AM        RECOMMENDATION     See After Visit Summary (AVS) for:  · Discharge instructions  · After [address]   · Special equipment needed (entered pre-discharge by Care Management)  · Medication Reconciliation    · Follow up Appointment(s)         Report given/sent by:  Bebeto Tenorio                    Verbal report given to: Nurse Opal Grandchild Niece           Estimated discharge time:  9/25/2020 at 1700

## 2020-09-25 NOTE — PROGRESS NOTES
Son present with patient, discharge instructions reviewed and verbalizes understanding. Peripheral IV removed. [facility] [address] [facility] Deirdre Vazquez.

## 2022-03-18 PROBLEM — I95.9 HYPOTENSION: Status: ACTIVE | Noted: 2020-09-21

## 2022-03-18 PROBLEM — R53.1 WEAKNESS: Status: ACTIVE | Noted: 2020-09-21

## 2022-03-18 PROBLEM — C64.9 RENAL CANCER (HCC): Status: ACTIVE | Noted: 2020-09-02

## 2022-03-18 PROBLEM — G93.41 ACUTE METABOLIC ENCEPHALOPATHY: Status: ACTIVE | Noted: 2020-09-21

## 2022-03-18 PROBLEM — E78.00 HIGH CHOLESTEROL: Status: ACTIVE | Noted: 2018-03-30

## 2022-03-19 PROBLEM — D64.9 ANEMIA: Status: ACTIVE | Noted: 2020-09-21

## 2022-03-19 PROBLEM — E87.6 HYPOKALEMIA: Status: ACTIVE | Noted: 2020-09-21

## 2022-03-19 PROBLEM — I10 HTN (HYPERTENSION): Status: ACTIVE | Noted: 2018-03-30

## 2022-03-19 PROBLEM — E86.0 DEHYDRATION: Status: ACTIVE | Noted: 2020-09-21

## 2023-05-11 RX ORDER — ONDANSETRON 4 MG/1
TABLET, ORALLY DISINTEGRATING ORAL EVERY 8 HOURS PRN
COMMUNITY
Start: 2020-09-25

## 2023-05-11 RX ORDER — ASPIRIN 81 MG/1
81 TABLET, CHEWABLE ORAL DAILY
COMMUNITY

## 2023-05-11 RX ORDER — FAMOTIDINE 20 MG/1
TABLET, FILM COATED ORAL DAILY
COMMUNITY
Start: 2020-09-26

## 2023-05-11 RX ORDER — OLMESARTAN MEDOXOMIL 40 MG/1
40 TABLET ORAL DAILY
COMMUNITY

## 2023-05-11 RX ORDER — PSEUDOEPHEDRINE HCL 30 MG
100 TABLET ORAL DAILY
COMMUNITY

## 2023-05-11 RX ORDER — ACETAMINOPHEN 325 MG/1
TABLET ORAL EVERY 6 HOURS PRN
COMMUNITY
Start: 2020-09-25

## 2023-05-11 RX ORDER — SIMVASTATIN 40 MG
40 TABLET ORAL NIGHTLY
COMMUNITY

## 2024-07-11 ENCOUNTER — HOSPITAL ENCOUNTER (EMERGENCY)
Facility: HOSPITAL | Age: 89
Discharge: HOME | End: 2024-07-11
Attending: STUDENT IN AN ORGANIZED HEALTH CARE EDUCATION/TRAINING PROGRAM
Payer: MEDICARE

## 2024-07-11 ENCOUNTER — APPOINTMENT (OUTPATIENT)
Facility: HOSPITAL | Age: 89
End: 2024-07-11
Payer: MEDICARE

## 2024-07-11 VITALS
TEMPERATURE: 98.1 F | HEART RATE: 99 BPM | DIASTOLIC BLOOD PRESSURE: 74 MMHG | RESPIRATION RATE: 16 BRPM | SYSTOLIC BLOOD PRESSURE: 194 MMHG | OXYGEN SATURATION: 92 %

## 2024-07-11 DIAGNOSIS — R53.83 LETHARGY: ICD-10-CM

## 2024-07-11 DIAGNOSIS — E86.0 DEHYDRATION: Primary | ICD-10-CM

## 2024-07-11 LAB
ALBUMIN SERPL-MCNC: 2.8 G/DL (ref 3.5–5)
ALBUMIN/GLOB SERPL: 0.7 (ref 1.1–2.2)
ALP SERPL-CCNC: 75 U/L (ref 45–117)
ALT SERPL-CCNC: 18 U/L (ref 12–78)
AMMONIA PLAS-SCNC: <10 UMOL/L
ANION GAP SERPL CALC-SCNC: 3 MMOL/L (ref 5–15)
APPEARANCE UR: ABNORMAL
AST SERPL-CCNC: 20 U/L (ref 15–37)
BACTERIA URNS QL MICRO: NEGATIVE /HPF
BASOPHILS # BLD: 0 K/UL (ref 0–0.1)
BASOPHILS NFR BLD: 1 % (ref 0–1)
BILIRUB SERPL-MCNC: 0.8 MG/DL (ref 0.2–1)
BILIRUB UR QL: NEGATIVE
BUN SERPL-MCNC: 27 MG/DL (ref 6–20)
BUN/CREAT SERPL: 31 (ref 12–20)
CALCIUM SERPL-MCNC: 11.5 MG/DL (ref 8.5–10.1)
CAOX CRY URNS QL MICRO: ABNORMAL
CHLORIDE SERPL-SCNC: 111 MMOL/L (ref 97–108)
CK SERPL-CCNC: 88 U/L (ref 26–192)
CO2 SERPL-SCNC: 28 MMOL/L (ref 21–32)
COLOR UR: ABNORMAL
COMMENT:: NORMAL
CREAT SERPL-MCNC: 0.88 MG/DL (ref 0.55–1.02)
DIFFERENTIAL METHOD BLD: ABNORMAL
EOSINOPHIL # BLD: 0.7 K/UL (ref 0–0.4)
EOSINOPHIL NFR BLD: 11 % (ref 0–7)
EPITH CASTS URNS QL MICRO: ABNORMAL /LPF
ERYTHROCYTE [DISTWIDTH] IN BLOOD BY AUTOMATED COUNT: 13.4 % (ref 11.5–14.5)
GLOBULIN SER CALC-MCNC: 4.3 G/DL (ref 2–4)
GLUCOSE SERPL-MCNC: 86 MG/DL (ref 65–100)
GLUCOSE UR STRIP.AUTO-MCNC: NEGATIVE MG/DL
HCT VFR BLD AUTO: 36.8 % (ref 35–47)
HGB BLD-MCNC: 11.8 G/DL (ref 11.5–16)
HGB UR QL STRIP: NEGATIVE
IMM GRANULOCYTES # BLD AUTO: 0 K/UL (ref 0–0.04)
IMM GRANULOCYTES NFR BLD AUTO: 0 % (ref 0–0.5)
KETONES UR QL STRIP.AUTO: ABNORMAL MG/DL
LACTATE SERPL-SCNC: 0.9 MMOL/L (ref 0.4–2)
LEUKOCYTE ESTERASE UR QL STRIP.AUTO: ABNORMAL
LYMPHOCYTES # BLD: 1.5 K/UL (ref 0.8–3.5)
LYMPHOCYTES NFR BLD: 24 % (ref 12–49)
MCH RBC QN AUTO: 30.8 PG (ref 26–34)
MCHC RBC AUTO-ENTMCNC: 32.1 G/DL (ref 30–36.5)
MCV RBC AUTO: 96.1 FL (ref 80–99)
MONOCYTES # BLD: 0.5 K/UL (ref 0–1)
MONOCYTES NFR BLD: 8 % (ref 5–13)
NEUTS SEG # BLD: 3.4 K/UL (ref 1.8–8)
NEUTS SEG NFR BLD: 56 % (ref 32–75)
NITRITE UR QL STRIP.AUTO: NEGATIVE
NRBC # BLD: 0 K/UL (ref 0–0.01)
NRBC BLD-RTO: 0 PER 100 WBC
PH UR STRIP: 5.5 (ref 5–8)
PLATELET # BLD AUTO: 208 K/UL (ref 150–400)
PMV BLD AUTO: 11 FL (ref 8.9–12.9)
POTASSIUM SERPL-SCNC: 4 MMOL/L (ref 3.5–5.1)
PROT SERPL-MCNC: 7.1 G/DL (ref 6.4–8.2)
PROT UR STRIP-MCNC: ABNORMAL MG/DL
RBC # BLD AUTO: 3.83 M/UL (ref 3.8–5.2)
RBC #/AREA URNS HPF: ABNORMAL /HPF (ref 0–5)
SODIUM SERPL-SCNC: 142 MMOL/L (ref 136–145)
SP GR UR REFRACTOMETRY: 1.02 (ref 1–1.03)
SPECIMEN HOLD: NORMAL
TROPONIN I SERPL HS-MCNC: 13 NG/L (ref 0–51)
TSH SERPL DL<=0.05 MIU/L-ACNC: 1.39 UIU/ML (ref 0.36–3.74)
URINE CULTURE IF INDICATED: ABNORMAL
UROBILINOGEN UR QL STRIP.AUTO: 1 EU/DL (ref 0.2–1)
WBC # BLD AUTO: 6.2 K/UL (ref 3.6–11)
WBC URNS QL MICRO: ABNORMAL /HPF (ref 0–4)

## 2024-07-11 PROCEDURE — 94761 N-INVAS EAR/PLS OXIMETRY MLT: CPT

## 2024-07-11 PROCEDURE — 85025 COMPLETE CBC W/AUTO DIFF WBC: CPT

## 2024-07-11 PROCEDURE — 71045 X-RAY EXAM CHEST 1 VIEW: CPT

## 2024-07-11 PROCEDURE — 84484 ASSAY OF TROPONIN QUANT: CPT

## 2024-07-11 PROCEDURE — 96361 HYDRATE IV INFUSION ADD-ON: CPT

## 2024-07-11 PROCEDURE — 99285 EMERGENCY DEPT VISIT HI MDM: CPT

## 2024-07-11 PROCEDURE — 87086 URINE CULTURE/COLONY COUNT: CPT

## 2024-07-11 PROCEDURE — 70450 CT HEAD/BRAIN W/O DYE: CPT

## 2024-07-11 PROCEDURE — 80053 COMPREHEN METABOLIC PANEL: CPT

## 2024-07-11 PROCEDURE — 81001 URINALYSIS AUTO W/SCOPE: CPT

## 2024-07-11 PROCEDURE — 82550 ASSAY OF CK (CPK): CPT

## 2024-07-11 PROCEDURE — 84443 ASSAY THYROID STIM HORMONE: CPT

## 2024-07-11 PROCEDURE — 96360 HYDRATION IV INFUSION INIT: CPT

## 2024-07-11 PROCEDURE — 93005 ELECTROCARDIOGRAM TRACING: CPT | Performed by: STUDENT IN AN ORGANIZED HEALTH CARE EDUCATION/TRAINING PROGRAM

## 2024-07-11 PROCEDURE — 36415 COLL VENOUS BLD VENIPUNCTURE: CPT

## 2024-07-11 PROCEDURE — 82140 ASSAY OF AMMONIA: CPT

## 2024-07-11 PROCEDURE — 83605 ASSAY OF LACTIC ACID: CPT

## 2024-07-11 PROCEDURE — 6370000000 HC RX 637 (ALT 250 FOR IP): Performed by: STUDENT IN AN ORGANIZED HEALTH CARE EDUCATION/TRAINING PROGRAM

## 2024-07-11 PROCEDURE — 2580000003 HC RX 258: Performed by: STUDENT IN AN ORGANIZED HEALTH CARE EDUCATION/TRAINING PROGRAM

## 2024-07-11 RX ORDER — CEFDINIR 300 MG/1
300 CAPSULE ORAL
Status: COMPLETED | OUTPATIENT
Start: 2024-07-11 | End: 2024-07-11

## 2024-07-11 RX ORDER — 0.9 % SODIUM CHLORIDE 0.9 %
500 INTRAVENOUS SOLUTION INTRAVENOUS ONCE
Status: COMPLETED | OUTPATIENT
Start: 2024-07-11 | End: 2024-07-11

## 2024-07-11 RX ORDER — 0.9 % SODIUM CHLORIDE 0.9 %
1000 INTRAVENOUS SOLUTION INTRAVENOUS ONCE
Status: COMPLETED | OUTPATIENT
Start: 2024-07-11 | End: 2024-07-11

## 2024-07-11 RX ORDER — CEFDINIR 300 MG/1
300 CAPSULE ORAL 2 TIMES DAILY
Qty: 14 CAPSULE | Refills: 0 | Status: SHIPPED | OUTPATIENT
Start: 2024-07-11 | End: 2024-07-18

## 2024-07-11 RX ADMIN — SODIUM CHLORIDE 500 ML: 9 INJECTION, SOLUTION INTRAVENOUS at 12:03

## 2024-07-11 RX ADMIN — SODIUM CHLORIDE 1000 ML: 9 INJECTION, SOLUTION INTRAVENOUS at 10:00

## 2024-07-11 RX ADMIN — CEFDINIR 300 MG: 300 CAPSULE ORAL at 12:00

## 2024-07-11 NOTE — ED TRIAGE NOTES
Pt arrives to ED via EMS from [facility] where staff called for AMS, unsure of for how long.  Per staff pt finished antibiotics 3 days ago for UTI.  Staff also reports pt is normally A&O x4.  Pt only responding to pain in triage.

## 2024-07-11 NOTE — ED PROVIDER NOTES
[facility] EMERGENCY DEPT  EMERGENCY DEPARTMENT ENCOUNTER      Pt Name: Naomi Mi  MRN: [id number]  Birthdate 5/7/1931  Date of evaluation: 7/11/2024  Provider: Amanda Soto MD    CHIEF COMPLAINT       Chief Complaint   Patient presents with    Altered Mental Status     HISTORY OF PRESENT ILLNESS   (Location/Symptom, Timing/Onset, Context/Setting, Quality, Duration, Modifying Factors, Severity)  Note limiting factors.   HPI  89-year-old female with pmhx of hypertension, hyperlipidemia, ovarian cancer, renal cancer, dementia, presents to the ER from nursing home facility for altered mental status, decreased responsiveness.  Unclear what patient's real baseline mental state or functional status is, though staff reportedly told EMS that patient is normally A&Ox4.  Patient reportedly just finished a course of antibiotics to treat a UTI 3 days prior.  She is currently unable to provide any history due to altered mental state.  Patient does open her eyes and withdraws to painful stimuli as well as to loud voice, but does not follow any commands for respond to questioning.    Review of External Medical Records:     Nursing Notes were reviewed.    REVIEW OF SYSTEMS    (2-9 systems for level 4, 10 or more for level 5)     Review of Systems   All other systems reviewed and are negative.      Except as noted above the remainder of the review of systems was reviewed and negative.       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 She is s/p a right robotic partial nephrectomy 3/20/17 for a t1a clear cell renal carcinoma         SURGICAL HISTORY       Past Surgical History:   Procedure Laterality Date    HYSTERECTOMY      KIDNEY REMOVAL Left 03/26/2018    PARTIAL     KIDNEY REMOVAL Right 03/20/2017    PARTIAL     SHOULDER

## 2024-07-12 LAB
BACTERIA SPEC CULT: NORMAL
EKG DIAGNOSIS: NORMAL
EKG Q-T INTERVAL: 428 MS
EKG QRS DURATION: 104 MS
EKG QTC CALCULATION (BAZETT): 394 MS
EKG R AXIS: -33 DEGREES
EKG T AXIS: 1 DEGREES
EKG VENTRICULAR RATE: 51 BPM
SERVICE CMNT-IMP: NORMAL

## 2024-07-12 PROCEDURE — 93010 ELECTROCARDIOGRAM REPORT: CPT | Performed by: SPECIALIST
